# Patient Record
Sex: MALE | Race: OTHER | Employment: FULL TIME | URBAN - METROPOLITAN AREA
[De-identification: names, ages, dates, MRNs, and addresses within clinical notes are randomized per-mention and may not be internally consistent; named-entity substitution may affect disease eponyms.]

---

## 2019-01-23 ENCOUNTER — HOSPITAL ENCOUNTER (EMERGENCY)
Age: 49
Discharge: HOME | End: 2019-01-23
Payer: COMMERCIAL

## 2019-01-23 VITALS
DIASTOLIC BLOOD PRESSURE: 88 MMHG | WEIGHT: 256 LBS | HEART RATE: 82 BPM | RESPIRATION RATE: 16 BRPM | HEIGHT: 68 IN | TEMPERATURE: 97.4 F | OXYGEN SATURATION: 98 % | SYSTOLIC BLOOD PRESSURE: 154 MMHG | BODY MASS INDEX: 38.8 KG/M2

## 2019-01-23 DIAGNOSIS — K27.9 PEPTIC ULCER DISEASE: ICD-10-CM

## 2019-01-23 DIAGNOSIS — R10.13 ABDOMINAL PAIN, EPIGASTRIC: Primary | ICD-10-CM

## 2019-01-23 LAB
ALBUMIN SERPL-MCNC: 4.1 G/DL (ref 3.9–4.9)
ALP BLD-CCNC: 72 U/L (ref 35–104)
ALT SERPL-CCNC: 10 U/L (ref 0–41)
ANION GAP SERPL CALCULATED.3IONS-SCNC: 13 MEQ/L (ref 7–13)
ANISOCYTOSIS: ABNORMAL
AST SERPL-CCNC: 14 U/L (ref 0–40)
BASOPHILS ABSOLUTE: 0.1 K/UL (ref 0–0.2)
BASOPHILS RELATIVE PERCENT: 0.8 %
BILIRUB SERPL-MCNC: <0.2 MG/DL (ref 0–1.2)
BUN BLDV-MCNC: 14 MG/DL (ref 6–20)
CALCIUM SERPL-MCNC: 8.6 MG/DL (ref 8.6–10.2)
CHLORIDE BLD-SCNC: 103 MEQ/L (ref 98–107)
CO2: 22 MEQ/L (ref 22–29)
CREAT SERPL-MCNC: 1.01 MG/DL (ref 0.7–1.2)
EKG ATRIAL RATE: 81 BPM
EKG P AXIS: 18 DEGREES
EKG P-R INTERVAL: 140 MS
EKG Q-T INTERVAL: 370 MS
EKG QRS DURATION: 88 MS
EKG QTC CALCULATION (BAZETT): 429 MS
EKG R AXIS: -4 DEGREES
EKG T AXIS: 5 DEGREES
EKG VENTRICULAR RATE: 81 BPM
EOSINOPHILS ABSOLUTE: 0.1 K/UL (ref 0–0.7)
EOSINOPHILS RELATIVE PERCENT: 1.7 %
GFR AFRICAN AMERICAN: >60
GFR NON-AFRICAN AMERICAN: >60
GLOBULIN: 3 G/DL (ref 2.3–3.5)
GLUCOSE BLD-MCNC: 112 MG/DL (ref 74–109)
HCT VFR BLD CALC: 25.3 % (ref 42–52)
HCT VFR BLD CALC: 25.5 % (ref 42–52)
HEMOGLOBIN: 7.4 G/DL (ref 14–18)
HEMOGLOBIN: 7.5 G/DL (ref 14–18)
HYPOCHROMIA: ABNORMAL
INR BLD: 1
LIPASE: 23 U/L (ref 13–60)
LYMPHOCYTES ABSOLUTE: 1 K/UL (ref 1–4.8)
LYMPHOCYTES RELATIVE PERCENT: 14.3 %
MCH RBC QN AUTO: 18 PG (ref 27–31.3)
MCHC RBC AUTO-ENTMCNC: 29.4 % (ref 33–37)
MCV RBC AUTO: 61.4 FL (ref 80–100)
MICROCYTES: ABNORMAL
MONOCYTES ABSOLUTE: 0.5 K/UL (ref 0.2–0.8)
MONOCYTES RELATIVE PERCENT: 7.5 %
NEUTROPHILS ABSOLUTE: 5.2 K/UL (ref 1.4–6.5)
NEUTROPHILS RELATIVE PERCENT: 75.7 %
OVALOCYTES: ABNORMAL
PDW BLD-RTO: 20.3 % (ref 11.5–14.5)
PLATELET # BLD: 316 K/UL (ref 130–400)
POIKILOCYTES: ABNORMAL
POLYCHROMASIA: ABNORMAL
POTASSIUM SERPL-SCNC: 3.9 MEQ/L (ref 3.5–5.1)
PROTHROMBIN TIME: 10.4 SEC (ref 9–11.5)
RBC # BLD: 4.14 M/UL (ref 4.7–6.1)
SLIDE REVIEW: ABNORMAL
SODIUM BLD-SCNC: 138 MEQ/L (ref 132–144)
TOTAL CK: 167 U/L (ref 0–190)
TOTAL PROTEIN: 7.1 G/DL (ref 6.4–8.1)
TROPONIN: <0.01 NG/ML (ref 0–0.01)
WBC # BLD: 6.9 K/UL (ref 4.8–10.8)

## 2019-01-23 PROCEDURE — 99284 EMERGENCY DEPT VISIT MOD MDM: CPT

## 2019-01-23 PROCEDURE — 85014 HEMATOCRIT: CPT

## 2019-01-23 PROCEDURE — 82550 ASSAY OF CK (CPK): CPT

## 2019-01-23 PROCEDURE — 6370000000 HC RX 637 (ALT 250 FOR IP): Performed by: NURSE PRACTITIONER

## 2019-01-23 PROCEDURE — 80053 COMPREHEN METABOLIC PANEL: CPT

## 2019-01-23 PROCEDURE — 85025 COMPLETE CBC W/AUTO DIFF WBC: CPT

## 2019-01-23 PROCEDURE — 85610 PROTHROMBIN TIME: CPT

## 2019-01-23 PROCEDURE — 93005 ELECTROCARDIOGRAM TRACING: CPT

## 2019-01-23 PROCEDURE — 36415 COLL VENOUS BLD VENIPUNCTURE: CPT

## 2019-01-23 PROCEDURE — 85018 HEMOGLOBIN: CPT

## 2019-01-23 PROCEDURE — 84484 ASSAY OF TROPONIN QUANT: CPT

## 2019-01-23 PROCEDURE — 83690 ASSAY OF LIPASE: CPT

## 2019-01-23 RX ORDER — PANTOPRAZOLE SODIUM 40 MG/1
40 TABLET, DELAYED RELEASE ORAL DAILY
Qty: 30 TABLET | Refills: 0 | Status: SHIPPED | OUTPATIENT
Start: 2019-01-23

## 2019-01-23 RX ORDER — SUCRALFATE 1 G/1
1 TABLET ORAL 4 TIMES DAILY
Qty: 120 TABLET | Refills: 3 | Status: SHIPPED | OUTPATIENT
Start: 2019-01-23 | End: 2021-04-06

## 2019-01-23 RX ORDER — MAGNESIUM HYDROXIDE/ALUMINUM HYDROXICE/SIMETHICONE 120; 1200; 1200 MG/30ML; MG/30ML; MG/30ML
15 SUSPENSION ORAL ONCE
Status: COMPLETED | OUTPATIENT
Start: 2019-01-23 | End: 2019-01-23

## 2019-01-23 RX ORDER — PANTOPRAZOLE SODIUM 40 MG/1
40 TABLET, DELAYED RELEASE ORAL ONCE
Status: COMPLETED | OUTPATIENT
Start: 2019-01-23 | End: 2019-01-23

## 2019-01-23 RX ADMIN — PANTOPRAZOLE SODIUM 40 MG: 40 TABLET, DELAYED RELEASE ORAL at 13:34

## 2019-01-23 RX ADMIN — ALUMINUM HYDROXIDE, MAGNESIUM HYDROXIDE, AND SIMETHICONE 15 ML: 200; 200; 20 SUSPENSION ORAL at 13:34

## 2019-01-23 RX ADMIN — LIDOCAINE HYDROCHLORIDE 10 ML: 20 SOLUTION ORAL; TOPICAL at 13:34

## 2019-01-23 ASSESSMENT — PAIN SCALES - GENERAL
PAINLEVEL_OUTOF10: 1
PAINLEVEL_OUTOF10: 0

## 2019-01-23 ASSESSMENT — PAIN DESCRIPTION - LOCATION: LOCATION: CHEST

## 2019-01-23 ASSESSMENT — PAIN SCALES - WONG BAKER: WONGBAKER_NUMERICALRESPONSE: 0;2

## 2019-01-23 ASSESSMENT — PAIN DESCRIPTION - PROGRESSION: CLINICAL_PROGRESSION: NOT CHANGED

## 2019-01-23 ASSESSMENT — PAIN - FUNCTIONAL ASSESSMENT: PAIN_FUNCTIONAL_ASSESSMENT: ACTIVITIES ARE NOT PREVENTED

## 2019-01-23 ASSESSMENT — PAIN DESCRIPTION - ONSET: ONSET: PROGRESSIVE

## 2019-01-23 ASSESSMENT — PAIN DESCRIPTION - FREQUENCY: FREQUENCY: INTERMITTENT

## 2019-01-23 ASSESSMENT — PAIN DESCRIPTION - PAIN TYPE: TYPE: ACUTE PAIN

## 2019-01-23 ASSESSMENT — PAIN DESCRIPTION - ORIENTATION: ORIENTATION: MID

## 2019-01-23 ASSESSMENT — PAIN DESCRIPTION - DESCRIPTORS: DESCRIPTORS: BURNING;SHARP

## 2019-01-24 PROCEDURE — 93010 ELECTROCARDIOGRAM REPORT: CPT | Performed by: INTERNAL MEDICINE

## 2021-04-04 ENCOUNTER — APPOINTMENT (OUTPATIENT)
Dept: CT IMAGING | Age: 51
End: 2021-04-04
Payer: COMMERCIAL

## 2021-04-04 ENCOUNTER — HOSPITAL ENCOUNTER (EMERGENCY)
Age: 51
Discharge: HOME | End: 2021-04-04
Payer: COMMERCIAL

## 2021-04-04 VITALS
TEMPERATURE: 98.7 F | SYSTOLIC BLOOD PRESSURE: 164 MMHG | DIASTOLIC BLOOD PRESSURE: 90 MMHG | HEART RATE: 90 BPM | BODY MASS INDEX: 40.92 KG/M2 | WEIGHT: 270 LBS | RESPIRATION RATE: 14 BRPM | OXYGEN SATURATION: 98 % | HEIGHT: 68 IN

## 2021-04-04 DIAGNOSIS — N20.0 KIDNEY STONE: Primary | ICD-10-CM

## 2021-04-04 LAB
ALBUMIN SERPL-MCNC: 4.5 G/DL (ref 3.5–4.6)
ALP BLD-CCNC: 89 U/L (ref 35–104)
ALT SERPL-CCNC: 21 U/L (ref 0–41)
ANION GAP SERPL CALCULATED.3IONS-SCNC: 9 MEQ/L (ref 9–15)
ANISOCYTOSIS: ABNORMAL
AST SERPL-CCNC: 24 U/L (ref 0–40)
BACTERIA: NEGATIVE /HPF
BASE EXCESS VENOUS: -1 (ref -3–3)
BASOPHILS ABSOLUTE: 0.1 K/UL (ref 0–0.2)
BASOPHILS RELATIVE PERCENT: 0.5 %
BILIRUB SERPL-MCNC: 0.5 MG/DL (ref 0.2–0.7)
BILIRUBIN URINE: NEGATIVE
BLOOD, URINE: ABNORMAL
BUN BLDV-MCNC: 23 MG/DL (ref 6–20)
CALCIUM IONIZED: 1.12 MMOL/L (ref 1.12–1.32)
CALCIUM SERPL-MCNC: 9.8 MG/DL (ref 8.5–9.9)
CHLORIDE BLD-SCNC: 97 MEQ/L (ref 95–107)
CLARITY: CLEAR
CO2: 26 MEQ/L (ref 20–31)
COLOR: ABNORMAL
CREAT SERPL-MCNC: 1.7 MG/DL (ref 0.7–1.2)
EOSINOPHILS ABSOLUTE: 0 K/UL (ref 0–0.7)
EOSINOPHILS RELATIVE PERCENT: 0.1 %
EPITHELIAL CELLS, UA: ABNORMAL /HPF (ref 0–5)
GFR AFRICAN AMERICAN: 49
GFR AFRICAN AMERICAN: 52.1
GFR NON-AFRICAN AMERICAN: 40
GFR NON-AFRICAN AMERICAN: 43.1
GLOBULIN: 3.8 G/DL (ref 2.3–3.5)
GLUCOSE BLD-MCNC: 135 MG/DL (ref 60–115)
GLUCOSE BLD-MCNC: 157 MG/DL (ref 70–99)
GLUCOSE URINE: NEGATIVE MG/DL
HCO3 VENOUS: 24.5 MMOL/L (ref 23–29)
HCT VFR BLD CALC: 40.8 % (ref 42–52)
HEMOGLOBIN: 12.6 G/DL (ref 14–18)
HEMOGLOBIN: 13.3 GM/DL (ref 13.5–17.5)
HYALINE CASTS: ABNORMAL /HPF (ref 0–5)
KETONES, URINE: NEGATIVE MG/DL
LACTATE: 1.92 MMOL/L (ref 0.4–2)
LEUKOCYTE ESTERASE, URINE: NEGATIVE
LIPASE: 18 U/L (ref 12–95)
LYMPHOCYTES ABSOLUTE: 1.2 K/UL (ref 1–4.8)
LYMPHOCYTES RELATIVE PERCENT: 7.9 %
MCH RBC QN AUTO: 22.3 PG (ref 27–31.3)
MCHC RBC AUTO-ENTMCNC: 30.9 % (ref 33–37)
MCV RBC AUTO: 72.1 FL (ref 80–100)
MICROCYTES: ABNORMAL
MONOCYTES ABSOLUTE: 1.2 K/UL (ref 0.2–0.8)
MONOCYTES RELATIVE PERCENT: 7.9 %
NEUTROPHILS ABSOLUTE: 13.1 K/UL (ref 1.4–6.5)
NEUTROPHILS RELATIVE PERCENT: 83.6 %
NITRITE, URINE: NEGATIVE
O2 SAT, VEN: 60 %
OVALOCYTES: ABNORMAL
PCO2, VEN: 41 MM HG (ref 40–50)
PDW BLD-RTO: 18 % (ref 11.5–14.5)
PERFORMED ON: ABNORMAL
PH UA: 6 (ref 5–9)
PH VENOUS: 7.38 (ref 7.35–7.45)
PLATELET # BLD: 275 K/UL (ref 130–400)
PLATELET SLIDE REVIEW: NORMAL
PO2, VEN: 32 MM HG
POC CHLORIDE: 101 MEQ/L (ref 99–110)
POC CREATININE: 1.8 MG/DL (ref 0.9–1.3)
POC HEMATOCRIT: 39 % (ref 41–53)
POC POTASSIUM: 4 MEQ/L (ref 3.5–5.1)
POC SAMPLE TYPE: ABNORMAL
POC SODIUM: 136 MEQ/L (ref 136–145)
POIKILOCYTES: ABNORMAL
POLYCHROMASIA: ABNORMAL
POTASSIUM SERPL-SCNC: 5.2 MEQ/L (ref 3.4–4.9)
PROTEIN UA: NEGATIVE MG/DL
RBC # BLD: 5.66 M/UL (ref 4.7–6.1)
RBC UA: ABNORMAL /HPF (ref 0–5)
SLIDE REVIEW: ABNORMAL
SODIUM BLD-SCNC: 132 MEQ/L (ref 135–144)
SPECIFIC GRAVITY UA: 1.02 (ref 1–1.03)
TCO2 CALC VENOUS: 26 MMOL/L
TEAR DROP CELLS: ABNORMAL
TOTAL PROTEIN: 8.3 G/DL (ref 6.3–8)
URINE REFLEX TO CULTURE: ABNORMAL
UROBILINOGEN, URINE: 0.2 E.U./DL
WBC # BLD: 15.7 K/UL (ref 4.8–10.8)
WBC UA: ABNORMAL /HPF (ref 0–5)

## 2021-04-04 PROCEDURE — 36600 WITHDRAWAL OF ARTERIAL BLOOD: CPT

## 2021-04-04 PROCEDURE — 6360000002 HC RX W HCPCS: Performed by: PERSONAL EMERGENCY RESPONSE ATTENDANT

## 2021-04-04 PROCEDURE — 84295 ASSAY OF SERUM SODIUM: CPT

## 2021-04-04 PROCEDURE — 82435 ASSAY OF BLOOD CHLORIDE: CPT

## 2021-04-04 PROCEDURE — 96375 TX/PRO/DX INJ NEW DRUG ADDON: CPT

## 2021-04-04 PROCEDURE — 96374 THER/PROPH/DIAG INJ IV PUSH: CPT

## 2021-04-04 PROCEDURE — 74150 CT ABDOMEN W/O CONTRAST: CPT

## 2021-04-04 PROCEDURE — 85025 COMPLETE CBC W/AUTO DIFF WBC: CPT

## 2021-04-04 PROCEDURE — 81001 URINALYSIS AUTO W/SCOPE: CPT

## 2021-04-04 PROCEDURE — 84132 ASSAY OF SERUM POTASSIUM: CPT

## 2021-04-04 PROCEDURE — 99282 EMERGENCY DEPT VISIT SF MDM: CPT

## 2021-04-04 PROCEDURE — 83690 ASSAY OF LIPASE: CPT

## 2021-04-04 PROCEDURE — 82803 BLOOD GASES ANY COMBINATION: CPT

## 2021-04-04 PROCEDURE — 85014 HEMATOCRIT: CPT

## 2021-04-04 PROCEDURE — 83605 ASSAY OF LACTIC ACID: CPT

## 2021-04-04 PROCEDURE — 99283 EMERGENCY DEPT VISIT LOW MDM: CPT

## 2021-04-04 PROCEDURE — 82330 ASSAY OF CALCIUM: CPT

## 2021-04-04 PROCEDURE — 36415 COLL VENOUS BLD VENIPUNCTURE: CPT

## 2021-04-04 PROCEDURE — 80053 COMPREHEN METABOLIC PANEL: CPT

## 2021-04-04 PROCEDURE — 2580000003 HC RX 258: Performed by: PERSONAL EMERGENCY RESPONSE ATTENDANT

## 2021-04-04 PROCEDURE — 82565 ASSAY OF CREATININE: CPT

## 2021-04-04 RX ORDER — TAMSULOSIN HYDROCHLORIDE 0.4 MG/1
0.4 CAPSULE ORAL DAILY
Qty: 5 CAPSULE | Refills: 0 | Status: SHIPPED | OUTPATIENT
Start: 2021-04-04 | End: 2021-04-30

## 2021-04-04 RX ORDER — ONDANSETRON 4 MG/1
4 TABLET, ORALLY DISINTEGRATING ORAL EVERY 8 HOURS PRN
Qty: 20 TABLET | Refills: 0 | Status: SHIPPED | OUTPATIENT
Start: 2021-04-04 | End: 2021-04-30

## 2021-04-04 RX ORDER — ONDANSETRON 2 MG/ML
4 INJECTION INTRAMUSCULAR; INTRAVENOUS ONCE
Status: COMPLETED | OUTPATIENT
Start: 2021-04-04 | End: 2021-04-04

## 2021-04-04 RX ORDER — 0.9 % SODIUM CHLORIDE 0.9 %
1000 INTRAVENOUS SOLUTION INTRAVENOUS ONCE
Status: COMPLETED | OUTPATIENT
Start: 2021-04-04 | End: 2021-04-04

## 2021-04-04 RX ORDER — HYDROCODONE BITARTRATE AND ACETAMINOPHEN 5; 325 MG/1; MG/1
1 TABLET ORAL EVERY 6 HOURS PRN
Qty: 10 TABLET | Refills: 0 | Status: SHIPPED | OUTPATIENT
Start: 2021-04-04 | End: 2021-04-07

## 2021-04-04 RX ORDER — KETOROLAC TROMETHAMINE 30 MG/ML
30 INJECTION, SOLUTION INTRAMUSCULAR; INTRAVENOUS ONCE
Status: COMPLETED | OUTPATIENT
Start: 2021-04-04 | End: 2021-04-04

## 2021-04-04 RX ADMIN — ONDANSETRON 4 MG: 2 INJECTION INTRAMUSCULAR; INTRAVENOUS at 03:49

## 2021-04-04 RX ADMIN — SODIUM CHLORIDE 1000 ML: 9 INJECTION, SOLUTION INTRAVENOUS at 03:49

## 2021-04-04 RX ADMIN — KETOROLAC TROMETHAMINE 30 MG: 30 INJECTION, SOLUTION INTRAMUSCULAR; INTRAVENOUS at 03:49

## 2021-04-04 ASSESSMENT — PAIN DESCRIPTION - DESCRIPTORS: DESCRIPTORS: SHARP;STABBING

## 2021-04-04 ASSESSMENT — PAIN DESCRIPTION - LOCATION: LOCATION: FLANK

## 2021-04-04 ASSESSMENT — ENCOUNTER SYMPTOMS
VOMITING: 1
BACK PAIN: 1
SORE THROAT: 0
BLOOD IN STOOL: 0
DIARRHEA: 0
COUGH: 0
COLOR CHANGE: 0
SHORTNESS OF BREATH: 0
ABDOMINAL PAIN: 1
RHINORRHEA: 0
NAUSEA: 1

## 2021-04-04 ASSESSMENT — PAIN SCALES - GENERAL
PAINLEVEL_OUTOF10: 8
PAINLEVEL_OUTOF10: 8

## 2021-04-04 ASSESSMENT — PAIN DESCRIPTION - FREQUENCY: FREQUENCY: CONTINUOUS

## 2021-04-04 NOTE — ED PROVIDER NOTES
(COLCRYS) 0.6 MG TABLET    Take 0.6 mg by mouth Take 0.6 mg by mouth    CYANOCOBALAMIN (VITAMIN B-12 CR PO)    Take by mouth    FOLIC ACID PO    Take by mouth    IBUPROFEN (ADVIL;MOTRIN) 200 MG TABLET    Take 200 mg by mouth as needed for Pain    INDOMETHACIN (INDOCIN) 50 MG CAPSULE    Take 50 mg by mouth Take 50 mg by mouth    LISINOPRIL (PRINIVIL;ZESTRIL) 20 MG TABLET    Take 1 tablet by mouth daily    MAGNESIUM PO    Take by mouth    OMEGA-3 FATTY ACIDS (FISH OIL PO)    Take by mouth    PANTOPRAZOLE (PROTONIX) 40 MG TABLET    Take 1 tablet by mouth daily    PROTEIN PO    Take by mouth    SUCRALFATE (CARAFATE) 1 GM TABLET    Take 1 tablet by mouth 4 times daily       ALLERGIES     Patient has no known allergies.     FAMILY HISTORY       Family History   Problem Relation Age of Onset    Diabetes Mother     Heart Disease Mother     Cancer Mother     Diabetes Father     Diabetes Brother           SOCIAL HISTORY       Social History     Socioeconomic History    Marital status:      Spouse name: None    Number of children: None    Years of education: None    Highest education level: None   Occupational History    None   Social Needs    Financial resource strain: None    Food insecurity     Worry: None     Inability: None    Transportation needs     Medical: None     Non-medical: None   Tobacco Use    Smoking status: Never Smoker    Smokeless tobacco: Never Used   Substance and Sexual Activity    Alcohol use: Yes     Comment: occ     Drug use: No    Sexual activity: Yes   Lifestyle    Physical activity     Days per week: None     Minutes per session: None    Stress: None   Relationships    Social connections     Talks on phone: None     Gets together: None     Attends Church service: None     Active member of club or organization: None     Attends meetings of clubs or organizations: None     Relationship status: None    Intimate partner violence     Fear of current or ex partner: None Emotionally abused: None     Physically abused: None     Forced sexual activity: None   Other Topics Concern    None   Social History Narrative    None         PHYSICAL EXAM         ED Triage Vitals [04/04/21 0258]   BP Temp Temp Source Pulse Resp SpO2 Height Weight   (!) 173/112 98.7 °F (37.1 °C) Temporal 98 16 99 % 5' 8\" (1.727 m) 270 lb (122.5 kg)       Physical Exam  Constitutional:       Appearance: He is well-developed. HENT:      Head: Normocephalic and atraumatic. Eyes:      Conjunctiva/sclera: Conjunctivae normal.      Pupils: Pupils are equal, round, and reactive to light. Neck:      Musculoskeletal: Normal range of motion and neck supple. Trachea: No tracheal deviation. Cardiovascular:      Heart sounds: Normal heart sounds. Pulmonary:      Effort: Pulmonary effort is normal. No respiratory distress. Breath sounds: Normal breath sounds. No stridor. Abdominal:      General: Bowel sounds are normal. There is no distension. Palpations: Abdomen is soft. There is no mass. Tenderness: There is abdominal tenderness. There is no guarding or rebound. Comments: Patient has mild left upper quadrant left lower quadrant abdominal tenderness to palpation. Abdomen soft and nondistended, no rebound or rigidity, no pulsatile mass or bruit, no ecchymosis. No flank ecchymosis. No CVA tenderness. Musculoskeletal: Normal range of motion. Skin:     General: Skin is warm and dry. Capillary Refill: Capillary refill takes less than 2 seconds. Findings: No rash. Neurological:      Mental Status: He is alert and oriented to person, place, and time. Deep Tendon Reflexes: Reflexes are normal and symmetric. Psychiatric:         Behavior: Behavior normal.         Thought Content:  Thought content normal.         Judgment: Judgment normal.         DIAGNOSTIC RESULTS     EKG:All EKG's are interpreted by the Emergency Department Physician who either signs or Co-signs this chart in the absence of a cardiologist.        RADIOLOGY:   Non-plain film images such as CT, Ultrasound and MRI are read by theradiologist. Plain radiographic images are visualized and preliminarily interpreted by the emergency physician with the below findings:    Interpretation per theRadiologist below, if available at the time of this note:    CT KIDNEY WO CONTRAST    (Results Pending)           LABS:  Labs Reviewed   COMPREHENSIVE METABOLIC PANEL - Abnormal; Notable for the following components:       Result Value    Sodium 132 (*)     Potassium 5.2 (*)     Glucose 157 (*)     BUN 23 (*)     CREATININE 1.70 (*)     GFR Non- 43.1 (*)     GFR  52.1 (*)     Total Protein 8.3 (*)     Globulin 3.8 (*)     All other components within normal limits   CBC WITH AUTO DIFFERENTIAL - Abnormal; Notable for the following components:    WBC 15.7 (*)     Hemoglobin 12.6 (*)     Hematocrit 40.8 (*)     MCV 72.1 (*)     MCH 22.3 (*)     MCHC 30.9 (*)     RDW 18.0 (*)     All other components within normal limits   POCT VENOUS - Abnormal; Notable for the following components:    POC Glucose 135 (*)     POC Creatinine 1.8 (*)     GFR Non- 40 (*)     GFR African American 49 (*)     Hemoglobin 13.3 (*)     POC Hematocrit 39 (*)     All other components within normal limits   LIPASE   URINE RT REFLEX TO CULTURE   POCT EPOC BLOOD GAS, LACTIC ACID, ICA       All other labs were within normal range or not returned as of this dictation.     EMERGENCY DEPARTMENT COURSE and DIFFERENTIAL DIAGNOSIS/MDM:   Vitals:    Vitals:    04/04/21 0258 04/04/21 0424 04/04/21 0430 04/04/21 0450   BP: (!) 173/112 (!) 156/90 (!) 164/90    Pulse: 98 99 90    Resp: 16 14     Temp: 98.7 °F (37.1 °C)      TempSrc: Temporal      SpO2: 99% 100% 98% 98%   Weight: 270 lb (122.5 kg)      Height: 5' 8\" (1.727 m)            MDM    Renal CT shows mild to moderate left hydronephrosis secondary to a 1 cm calculus at the left ureteropelvic junction. There is associated left perinephric stranding. Lab revealed sodium 132, potassium 5.2, BUN 23, creatinine 1.7, WBC 15. 7. patient was given 1 L IV fluids and Toradol and Zofran. After IV fluids lab work was repeated with potassium within normal limits at 4, creatinine 1.8. Patient appears nontoxic in no apparent distress. Urine shows no infection. Patient be placed on Flomax, Zofran, Norco with urology follow-up. He is aware if symptoms are to worsen to return to the emergency room. Standard anticipatory guidance given to patient upon discharge. Have given them a specific time frame in which to follow-up and who to follow-up with. I have also advised them that they should return to the emergency department if they get worse, or not getting better or develop any new or concerning symptoms. Patient demonstrates understanding. CRITICAL CARE TIME   Total Critical Caretime was 0 minutes, excluding separately reportable procedures. There was a high probability of clinically significant/life threatening deterioration in the patient's condition which required my urgent intervention. Procedures    FINAL IMPRESSION      1. Kidney stone          DISPOSITION/PLAN   DISPOSITION Discharge - Pending Orders Complete 04/04/2021 05:03:45 AM      PATIENT REFERRED TO:  Roxy Tomas MD  [address] [address] [phone]    Call         DISCHARGE MEDICATIONS:  New Prescriptions    HYDROCODONE-ACETAMINOPHEN (NORCO) 5-325 MG PER TABLET    Take 1 tablet by mouth every 6 hours as needed for Pain for up to 3 days. Intended supply: 3 days.  Take lowest dose possible to manage pain    ONDANSETRON (ZOFRAN ODT) 4 MG DISINTEGRATING TABLET    Take 1 tablet by mouth every 8 hours as needed for Nausea    TAMSULOSIN (FLOMAX) 0.4 MG CAPSULE    Take 1 capsule by mouth daily for 5 doses          (Please notethat portions of this note were completed with a voice recognition

## 2021-04-04 NOTE — ED NOTES
Pt states nausea is gone and pain is better. Caty COLEMAN updated. Vitals updated. Call bell in reach. Lights turned off.   Pt given warm blanket     Analilia Galo RN  04/04/21 9818

## 2021-04-06 ENCOUNTER — HOSPITAL ENCOUNTER (OUTPATIENT)
Dept: GENERAL RADIOLOGY | Age: 51
Discharge: HOME | End: 2021-04-08
Payer: COMMERCIAL

## 2021-04-06 ENCOUNTER — TELEPHONE (OUTPATIENT)
Dept: UROLOGY | Age: 51
End: 2021-04-06

## 2021-04-06 ENCOUNTER — OFFICE VISIT (OUTPATIENT)
Dept: UROLOGY | Age: 51
End: 2021-04-06
Payer: COMMERCIAL

## 2021-04-06 VITALS
BODY MASS INDEX: 40.92 KG/M2 | SYSTOLIC BLOOD PRESSURE: 140 MMHG | HEART RATE: 95 BPM | WEIGHT: 270 LBS | DIASTOLIC BLOOD PRESSURE: 100 MMHG | HEIGHT: 68 IN

## 2021-04-06 DIAGNOSIS — N20.2 CALCULUS OF KIDNEY WITH CALCULUS OF URETER: Primary | ICD-10-CM

## 2021-04-06 DIAGNOSIS — Z01.818 PREOP TESTING: Primary | ICD-10-CM

## 2021-04-06 DIAGNOSIS — R33.9 RETENTION OF URINE: ICD-10-CM

## 2021-04-06 DIAGNOSIS — N20.0 KIDNEY STONE: ICD-10-CM

## 2021-04-06 DIAGNOSIS — N20.0 KIDNEY STONE: Primary | ICD-10-CM

## 2021-04-06 LAB — POST VOID RESIDUAL (PVR): 29 ML

## 2021-04-06 PROCEDURE — 51798 US URINE CAPACITY MEASURE: CPT | Performed by: UROLOGY

## 2021-04-06 PROCEDURE — 99204 OFFICE O/P NEW MOD 45 MIN: CPT | Performed by: UROLOGY

## 2021-04-06 PROCEDURE — 74018 RADEX ABDOMEN 1 VIEW: CPT

## 2021-04-06 RX ORDER — TAMSULOSIN HYDROCHLORIDE 0.4 MG/1
0.4 CAPSULE ORAL DAILY
Qty: 15 CAPSULE | Refills: 0 | Status: SHIPPED | OUTPATIENT
Start: 2021-04-06 | End: 2021-04-30

## 2021-04-06 RX ORDER — CEPHALEXIN 500 MG/1
500 CAPSULE ORAL EVERY 12 HOURS
Qty: 20 CAPSULE | Refills: 0 | Status: SHIPPED | OUTPATIENT
Start: 2021-04-06 | End: 2021-04-30

## 2021-04-06 RX ORDER — OXYCODONE HYDROCHLORIDE AND ACETAMINOPHEN 5; 325 MG/1; MG/1
1 TABLET ORAL EVERY 6 HOURS PRN
Qty: 20 TABLET | Refills: 0 | Status: SHIPPED | OUTPATIENT
Start: 2021-04-06 | End: 2021-04-11

## 2021-04-06 NOTE — PROGRESS NOTES
[facility] EVALUATION NOTE                                                 H&P                                                                                                                                                 Reason for Visit  Left renal colic secondary to left ureteral calculus at the left UPJ    History of Present Illness  2-week history of severe renal colic  Currently asymptomatic  CT scan shows 1.1 cm stone in and around the UPJ  Current KUB shows a stone probably in the renal pelvis  Patient has not had renal colic in some time  Denies hematuria  No previous history of stones  Urine pH is 6  Unlikely to be uric acid stone although very faint        Urologic Review of Systems/Symptoms  No previous history of stones    Review of Systems  Hospitalization: No recent hospitalization  All 14 categories of Review of Systems otherwise reviewed no other findings reported. History reviewed. No pertinent past medical history.   Past Surgical History:   Procedure Laterality Date    APPENDECTOMY  1976     Social History     Socioeconomic History    Marital status:      Spouse name: None    Number of children: None    Years of education: None    Highest education level: None   Occupational History    None   Social Needs    Financial resource strain: None    Food insecurity     Worry: None     Inability: None    Transportation needs     Medical: None     Non-medical: None   Tobacco Use    Smoking status: Never Smoker    Smokeless tobacco: Never Used   Substance and Sexual Activity    Alcohol use: Yes     Comment: occ     Drug use: No    Sexual activity: Yes   Lifestyle    Physical activity     Days per week: None     Minutes per session: None    Stress: None   Relationships    Social connections     Talks on phone: None     Gets together: None     Attends Congregation service: None     Active member of club or organization: None     Attends meetings of clubs or organizations: None     Relationship status: None    Intimate partner violence     Fear of current or ex partner: None     Emotionally abused: None     Physically abused: None     Forced sexual activity: None   Other Topics Concern    None   Social History Narrative    None     Family History   Problem Relation Age of Onset    Diabetes Mother     Heart Disease Mother     Cancer Mother     Diabetes Father     Diabetes Brother      Current Outpatient Medications   Medication Sig Dispense Refill    Omega-3 Fatty Acids (FISH OIL PO) Take by mouth      tamsulosin (FLOMAX) 0.4 MG capsule Take 1 capsule by mouth daily for 5 doses 5 capsule 0    pantoprazole (PROTONIX) 40 MG tablet Take 1 tablet by mouth daily 30 tablet 0    Cholecalciferol (VITAMIN D PO) Take by mouth      Ascorbic Acid (VITAMIN C) 500 MG tablet Take 500 mg by mouth daily      ondansetron (ZOFRAN ODT) 4 MG disintegrating tablet Take 1 tablet by mouth every 8 hours as needed for Nausea (Patient not taking: Reported on 4/6/2021) 20 tablet 0    HYDROcodone-acetaminophen (NORCO) 5-325 MG per tablet Take 1 tablet by mouth every 6 hours as needed for Pain for up to 3 days. Intended supply: 3 days. Take lowest dose possible to manage pain (Patient not taking: Reported on 4/6/2021) 10 tablet 0    ibuprofen (ADVIL;MOTRIN) 200 MG tablet Take 200 mg by mouth as needed for Pain       No current facility-administered medications for this visit. Patient has no known allergies. All reviewed and verified by Dr Janina Trivedi on today's visit    No results found for: PSA, PSADIA  No results found for this visit on 04/06/21. Physical Exam  Vitals:    04/06/21 1017   BP: (!) 140/100   Pulse: 95   Weight: 270 lb (122.5 kg)   Height: 5' 8\" (1.727 m)     Constitutional: Not in distress currently. Head and Neck: Normal  Cardiovascular: Normal rate, BP reviewed. Regular rhythm  Pulmonary/Chest: Normal respiratory effort not short of breath  Abdominal: Not distended.   Normal Urologic Exam  CT reviewed. Musculoskeletal: Ambulatory. Extremities: No edema  Neurological: No deficits  Skin: No lesions  Psychiatric: Normal affect. Assessment/Medical Necessity-Decision Making  Left renal colic secondary to a left UPJ stone very faint on KUB  Plan is left shockwave lithotripsy with possible retrograde pyelogram stone cannot be visualized with possible double-J stent placement  Patient given additional medication for pain, antibiotics, and tamsulosin  Patient also advised to lie down with the left side down to allow the stone to fall back into the renal pelvis  Plan  Patient will be scheduled for 4/14/2021  All risks and benefits outlined in detail  Greater than 50% of 50 minutes spent consulting patient face-to-face  No orders of the defined types were placed in this encounter. No orders of the defined types were placed in this encounter. Marlin Vigil MD       Please note this report has been partially produced using speech recognition software  And may cause contain errors related to that system including grammar, punctuation and spelling as well as words and phrases that may seem inappropriate. If there are questions or concerns please feel free to contact me to clarify.

## 2021-04-08 ENCOUNTER — NURSE ONLY (OUTPATIENT)
Dept: PRIMARY CARE CLINIC | Age: 51
End: 2021-04-08

## 2021-04-09 LAB
SARS-COV-2: NOT DETECTED
SOURCE: NORMAL

## 2021-04-14 ENCOUNTER — HOSPITAL ENCOUNTER (OUTPATIENT)
Age: 51
Setting detail: OUTPATIENT SURGERY
Discharge: HOME | End: 2021-04-14
Attending: UROLOGY | Admitting: UROLOGY
Payer: COMMERCIAL

## 2021-04-14 ENCOUNTER — ANESTHESIA (OUTPATIENT)
Dept: OPERATING ROOM | Age: 51
End: 2021-04-14
Payer: COMMERCIAL

## 2021-04-14 ENCOUNTER — APPOINTMENT (OUTPATIENT)
Dept: GENERAL RADIOLOGY | Age: 51
End: 2021-04-14
Attending: UROLOGY
Payer: COMMERCIAL

## 2021-04-14 ENCOUNTER — ANESTHESIA EVENT (OUTPATIENT)
Dept: OPERATING ROOM | Age: 51
End: 2021-04-14
Payer: COMMERCIAL

## 2021-04-14 VITALS
HEART RATE: 96 BPM | TEMPERATURE: 97.4 F | WEIGHT: 265 LBS | OXYGEN SATURATION: 96 % | RESPIRATION RATE: 15 BRPM | DIASTOLIC BLOOD PRESSURE: 92 MMHG | HEIGHT: 68 IN | SYSTOLIC BLOOD PRESSURE: 172 MMHG | BODY MASS INDEX: 40.16 KG/M2

## 2021-04-14 VITALS — DIASTOLIC BLOOD PRESSURE: 52 MMHG | SYSTOLIC BLOOD PRESSURE: 95 MMHG | TEMPERATURE: 98.6 F | OXYGEN SATURATION: 100 %

## 2021-04-14 DIAGNOSIS — N20.0 CALCULUS OF KIDNEY: Primary | ICD-10-CM

## 2021-04-14 PROCEDURE — 74018 RADEX ABDOMEN 1 VIEW: CPT

## 2021-04-14 PROCEDURE — 3700000000 HC ANESTHESIA ATTENDED CARE: Performed by: UROLOGY

## 2021-04-14 PROCEDURE — 7100000000 HC PACU RECOVERY - FIRST 15 MIN: Performed by: UROLOGY

## 2021-04-14 PROCEDURE — 3600000004 HC SURGERY LEVEL 4 BASE: Performed by: UROLOGY

## 2021-04-14 PROCEDURE — 7100000001 HC PACU RECOVERY - ADDTL 15 MIN: Performed by: UROLOGY

## 2021-04-14 PROCEDURE — 2580000003 HC RX 258: Performed by: UROLOGY

## 2021-04-14 PROCEDURE — 6360000002 HC RX W HCPCS: Performed by: NURSE ANESTHETIST, CERTIFIED REGISTERED

## 2021-04-14 PROCEDURE — 2709999900 HC NON-CHARGEABLE SUPPLY: Performed by: UROLOGY

## 2021-04-14 PROCEDURE — 7100000011 HC PHASE II RECOVERY - ADDTL 15 MIN: Performed by: UROLOGY

## 2021-04-14 PROCEDURE — 7100000010 HC PHASE II RECOVERY - FIRST 15 MIN: Performed by: UROLOGY

## 2021-04-14 PROCEDURE — 3700000001 HC ADD 15 MINUTES (ANESTHESIA): Performed by: UROLOGY

## 2021-04-14 PROCEDURE — 3600000014 HC SURGERY LEVEL 4 ADDTL 15MIN: Performed by: UROLOGY

## 2021-04-14 PROCEDURE — 50590 FRAGMENTING OF KIDNEY STONE: CPT | Performed by: UROLOGY

## 2021-04-14 RX ORDER — ONDANSETRON 2 MG/ML
4 INJECTION INTRAMUSCULAR; INTRAVENOUS
Status: DISCONTINUED | OUTPATIENT
Start: 2021-04-14 | End: 2021-04-14 | Stop reason: HOSPADM

## 2021-04-14 RX ORDER — MEPERIDINE HYDROCHLORIDE 25 MG/ML
12.5 INJECTION INTRAMUSCULAR; INTRAVENOUS; SUBCUTANEOUS EVERY 5 MIN PRN
Status: DISCONTINUED | OUTPATIENT
Start: 2021-04-14 | End: 2021-04-14 | Stop reason: HOSPADM

## 2021-04-14 RX ORDER — DEXAMETHASONE SODIUM PHOSPHATE 10 MG/ML
INJECTION INTRAMUSCULAR; INTRAVENOUS PRN
Status: DISCONTINUED | OUTPATIENT
Start: 2021-04-14 | End: 2021-04-14 | Stop reason: SDUPTHER

## 2021-04-14 RX ORDER — HYDROCODONE BITARTRATE AND ACETAMINOPHEN 5; 325 MG/1; MG/1
2 TABLET ORAL PRN
Status: DISCONTINUED | OUTPATIENT
Start: 2021-04-14 | End: 2021-04-14 | Stop reason: HOSPADM

## 2021-04-14 RX ORDER — HYDROCODONE BITARTRATE AND ACETAMINOPHEN 5; 325 MG/1; MG/1
1 TABLET ORAL EVERY 4 HOURS PRN
Qty: 18 TABLET | Refills: 0 | Status: SHIPPED | OUTPATIENT
Start: 2021-04-14 | End: 2021-04-17

## 2021-04-14 RX ORDER — FENTANYL CITRATE 50 UG/ML
50 INJECTION, SOLUTION INTRAMUSCULAR; INTRAVENOUS EVERY 10 MIN PRN
Status: DISCONTINUED | OUTPATIENT
Start: 2021-04-14 | End: 2021-04-14 | Stop reason: HOSPADM

## 2021-04-14 RX ORDER — KETOROLAC TROMETHAMINE 30 MG/ML
INJECTION, SOLUTION INTRAMUSCULAR; INTRAVENOUS PRN
Status: DISCONTINUED | OUTPATIENT
Start: 2021-04-14 | End: 2021-04-14 | Stop reason: SDUPTHER

## 2021-04-14 RX ORDER — SODIUM CHLORIDE, SODIUM LACTATE, POTASSIUM CHLORIDE, CALCIUM CHLORIDE 600; 310; 30; 20 MG/100ML; MG/100ML; MG/100ML; MG/100ML
INJECTION, SOLUTION INTRAVENOUS CONTINUOUS
Status: DISCONTINUED | OUTPATIENT
Start: 2021-04-14 | End: 2021-04-14 | Stop reason: HOSPADM

## 2021-04-14 RX ORDER — MIDAZOLAM HYDROCHLORIDE 2 MG/2ML
INJECTION, SOLUTION INTRAMUSCULAR; INTRAVENOUS PRN
Status: DISCONTINUED | OUTPATIENT
Start: 2021-04-14 | End: 2021-04-14 | Stop reason: SDUPTHER

## 2021-04-14 RX ORDER — LIDOCAINE HYDROCHLORIDE 20 MG/ML
INJECTION, SOLUTION INTRAVENOUS PRN
Status: DISCONTINUED | OUTPATIENT
Start: 2021-04-14 | End: 2021-04-14 | Stop reason: SDUPTHER

## 2021-04-14 RX ORDER — DIPHENHYDRAMINE HYDROCHLORIDE 50 MG/ML
12.5 INJECTION INTRAMUSCULAR; INTRAVENOUS
Status: DISCONTINUED | OUTPATIENT
Start: 2021-04-14 | End: 2021-04-14 | Stop reason: HOSPADM

## 2021-04-14 RX ORDER — FENTANYL CITRATE 50 UG/ML
INJECTION, SOLUTION INTRAMUSCULAR; INTRAVENOUS PRN
Status: DISCONTINUED | OUTPATIENT
Start: 2021-04-14 | End: 2021-04-14 | Stop reason: SDUPTHER

## 2021-04-14 RX ORDER — TAMSULOSIN HYDROCHLORIDE 0.4 MG/1
0.4 CAPSULE ORAL DAILY
Qty: 10 CAPSULE | Refills: 0 | Status: SHIPPED | OUTPATIENT
Start: 2021-04-14 | End: 2021-04-30

## 2021-04-14 RX ORDER — METOCLOPRAMIDE HYDROCHLORIDE 5 MG/ML
10 INJECTION INTRAMUSCULAR; INTRAVENOUS
Status: DISCONTINUED | OUTPATIENT
Start: 2021-04-14 | End: 2021-04-14 | Stop reason: HOSPADM

## 2021-04-14 RX ORDER — PROPOFOL 10 MG/ML
INJECTION, EMULSION INTRAVENOUS PRN
Status: DISCONTINUED | OUTPATIENT
Start: 2021-04-14 | End: 2021-04-14 | Stop reason: SDUPTHER

## 2021-04-14 RX ORDER — HYDROCODONE BITARTRATE AND ACETAMINOPHEN 5; 325 MG/1; MG/1
1 TABLET ORAL PRN
Status: DISCONTINUED | OUTPATIENT
Start: 2021-04-14 | End: 2021-04-14 | Stop reason: HOSPADM

## 2021-04-14 RX ORDER — ONDANSETRON 2 MG/ML
INJECTION INTRAMUSCULAR; INTRAVENOUS PRN
Status: DISCONTINUED | OUTPATIENT
Start: 2021-04-14 | End: 2021-04-14 | Stop reason: SDUPTHER

## 2021-04-14 RX ADMIN — DEXAMETHASONE SODIUM PHOSPHATE 10 MG: 10 INJECTION INTRAMUSCULAR; INTRAVENOUS at 13:14

## 2021-04-14 RX ADMIN — KETOROLAC TROMETHAMINE 30 MG: 30 INJECTION, SOLUTION INTRAMUSCULAR; INTRAVENOUS at 13:14

## 2021-04-14 RX ADMIN — LIDOCAINE HYDROCHLORIDE 50 MG: 20 INJECTION, SOLUTION INTRAVENOUS at 13:06

## 2021-04-14 RX ADMIN — MIDAZOLAM HYDROCHLORIDE 2 MG: 1 INJECTION, SOLUTION INTRAMUSCULAR; INTRAVENOUS at 13:04

## 2021-04-14 RX ADMIN — FENTANYL CITRATE 50 MCG: 50 INJECTION, SOLUTION INTRAMUSCULAR; INTRAVENOUS at 13:04

## 2021-04-14 RX ADMIN — SODIUM CHLORIDE, POTASSIUM CHLORIDE, SODIUM LACTATE AND CALCIUM CHLORIDE: 600; 310; 30; 20 INJECTION, SOLUTION INTRAVENOUS at 11:40

## 2021-04-14 RX ADMIN — FENTANYL CITRATE 50 MCG: 50 INJECTION, SOLUTION INTRAMUSCULAR; INTRAVENOUS at 13:14

## 2021-04-14 RX ADMIN — PROPOFOL 200 MG: 10 INJECTION, EMULSION INTRAVENOUS at 13:06

## 2021-04-14 RX ADMIN — ONDANSETRON 4 MG: 2 INJECTION INTRAMUSCULAR; INTRAVENOUS at 13:14

## 2021-04-14 ASSESSMENT — PULMONARY FUNCTION TESTS
PIF_VALUE: 11
PIF_VALUE: 4
PIF_VALUE: 11
PIF_VALUE: 2
PIF_VALUE: 4
PIF_VALUE: 1
PIF_VALUE: 10
PIF_VALUE: 11
PIF_VALUE: 15
PIF_VALUE: 11
PIF_VALUE: 5
PIF_VALUE: 11
PIF_VALUE: 11
PIF_VALUE: 3
PIF_VALUE: 11
PIF_VALUE: 2
PIF_VALUE: 0
PIF_VALUE: 11
PIF_VALUE: 5
PIF_VALUE: 2
PIF_VALUE: 11
PIF_VALUE: 2
PIF_VALUE: 11

## 2021-04-14 NOTE — ANESTHESIA PRE PROCEDURE
Department of Anesthesiology  Preprocedure Note       Name:  Alexey Wood   Age:  52 y.o.  :  1970                                          MRN:  [id number]         Date:  2021      Surgeon: Jaci Ram):  Holden Stein MD    Procedure: Procedure(s):  LEFT ESWL EXTRACORPOREAL SHOCK WAVE LITHOTRIPSY, RECENT LABS, DR TO DO H&P. KUB ON ARRIVAL. ([address] NOTIFIED CONF. #[id number])    Medications prior to admission:   Prior to Admission medications    Medication Sig Start Date End Date Taking?  Authorizing Provider   Omega-3 Fatty Acids (FISH OIL PO) Take by mouth    Historical Provider, MD   tamsulosin (FLOMAX) 0.4 MG capsule Take 1 capsule by mouth daily 21   Holden Stein MD   cephALEXin (KEFLEX) 500 MG capsule Take 1 capsule by mouth every 12 hours 21   Holden Stein MD   tamsulosin [facility]) 0.4 MG capsule Take 1 capsule by mouth daily for 5 doses 21  JARED Byers   ondansetron (ZOFRAN ODT) 4 MG disintegrating tablet Take 1 tablet by mouth every 8 hours as needed for Nausea  Patient not taking: Reported on 2021   JARED Byers   pantoprazole (PROTONIX) 40 MG tablet Take 1 tablet by mouth daily 19   LANCE Dotson - CNP   ibuprofen (ADVIL;MOTRIN) 200 MG tablet Take 200 mg by mouth as needed for Pain    Historical Provider, MD   Cholecalciferol (VITAMIN D PO) Take by mouth    Historical Provider, MD   Ascorbic Acid (VITAMIN C) 500 MG tablet Take 500 mg by mouth daily    Historical Provider, MD       Current medications:    Current Facility-Administered Medications   Medication Dose Route Frequency Provider Last Rate Last Admin    lactated ringers infusion   Intravenous Continuous Holden Stein MD        meperidine (DEMEROL) injection 12.5 mg  12.5 mg Intravenous Q5 Min PRN Shirley Gonzalez MD        fentaNYL (SUBLIMAZE) injection 50 mcg  50 mcg Intravenous Q10 Min PRN Shirley Gonzalez MD        HYDROmorphone (DILAUDID) injection 0.5 mg  0.5 mg Intravenous Q10 Min PRN Shirley Gonzalez MD        HYDROcodone-acetaminophen [facility]) 5-325 MG per tablet 1 tablet  1 tablet Oral PRN Shirley Gonzalez MD        Or    HYDROcodone-acetaminophen [facility]) 5-325 MG per tablet 2 tablet  2 tablet Oral PRN Shirley Gonzalez MD        ondansetron [facility]) injection 4 mg  4 mg Intravenous Once PRN Shirley Gonazlez MD        metoclopramide [facility]) injection 10 mg  10 mg Intravenous Once PRN Shirley Gonzalez MD        diphenhydrAMINE (BENADRYL) injection 12.5 mg  12.5 mg Intravenous Once PRN Shirley Gonzalez MD           Allergies:  No Known Allergies    Problem List:  There is no problem list on file for this patient. Past Medical History:  No past medical history on file. Past Surgical History:        Procedure Laterality Date    APPENDECTOMY  1976       Social History:    Social History     Tobacco Use    Smoking status: Never Smoker    Smokeless tobacco: Never Used   Substance Use Topics    Alcohol use: Yes     Comment: occ                                 Counseling given: Not Answered      Vital Signs (Current): There were no vitals filed for this visit.                                            BP Readings from Last 3 Encounters:   04/06/21 (!) 140/100   04/04/21 (!) 164/90   01/23/19 (!) 154/88       NPO Status:                                                                                 BMI:   Wt Readings from Last 3 Encounters:   04/06/21 270 lb (122.5 kg)   04/04/21 270 lb (122.5 kg)   01/23/19 256 lb (116.1 kg)     There is no height or weight on file to calculate BMI.    CBC:   Lab Results   Component Value Date    WBC 15.7 04/04/2021    RBC 5.66 04/04/2021    HGB 13.3 04/04/2021    HCT 40.8 04/04/2021    MCV 72.1 04/04/2021    RDW 18.0 04/04/2021     04/04/2021       CMP:   Lab Results   Component Value Date     04/04/2021    K 5.2 04/04/2021    CL 97 04/04/2021    CO2 26 04/04/2021 BUN 23 04/04/2021    CREATININE 1.8 04/04/2021    CREATININE 1.70 04/04/2021    GFRAA 49 04/04/2021    LABGLOM 40 04/04/2021    GLUCOSE 157 04/04/2021    GLUCOSE 137 02/02/2020    PROT 8.3 04/04/2021    CALCIUM 9.8 04/04/2021    BILITOT 0.5 04/04/2021    ALKPHOS 89 04/04/2021    AST 24 04/04/2021    ALT 21 04/04/2021       POC Tests: No results for input(s): POCGLU, POCNA, POCK, POCCL, POCBUN, POCHEMO, POCHCT in the last 72 hours. Coags:   Lab Results   Component Value Date    PROTIME 10.4 01/23/2019    INR 1.0 01/23/2019       HCG (If Applicable): No results found for: PREGTESTUR, PREGSERUM, HCG, HCGQUANT     ABGs: No results found for: PHART, PO2ART, BUL3SLW, OYE7OLT, BEART, P4KCCHQW     Type & Screen (If Applicable):  No results found for: LABABO, LABRH    Drug/Infectious Status (If Applicable):  No results found for: HIV, HEPCAB    COVID-19 Screening (If Applicable):   Lab Results   Component Value Date    COVID19 Not Detected 04/08/2021           Anesthesia Evaluation  Patient summary reviewed and Nursing notes reviewed no history of anesthetic complications:   Airway: Mallampati: II  TM distance: >3 FB   Neck ROM: full  Mouth opening: > = 3 FB Dental: normal exam         Pulmonary:Negative Pulmonary ROS and normal exam                               Cardiovascular:Negative CV ROS                      Neuro/Psych:               GI/Hepatic/Renal: Neg GI/Hepatic/Renal ROS            Endo/Other: Negative Endo/Other ROS                    Abdominal:   (+) obese,         Vascular: negative vascular ROS. Anesthesia Plan      general     ASA 2           MIPS: Prophylactic antiemetics administered. Plan discussed with CRNA.     Attending anesthesiologist reviewed and agrees with Pre Eval content              Cristi Pak MD   4/14/2021

## 2021-04-14 NOTE — OP NOTE
UROLOGY OPERATIVE NOTE    Pre-operative Diagnosis: left renal calculus  Post-operative Diagnosis:  Same  Procedure:  Left ESWL  Surgeon:  Savana Wong  Anesthesia:  Gen  2500 shocks with excellent fragmentation  See dictated operative report for full details.   Electronically signed by Chitra Banuelos MD on 4/14/21 at 1:59 PM EDT

## 2021-04-14 NOTE — BRIEF OP NOTE
UROLOGY OPERATIVE NOTE    Pre-operative Diagnosis: Left renal calculus  Post-operative Diagnosis:  Same  Procedure:  Left ESWL  Surgeon:  Jessica Best  Anesthesia:  General LMA  2500 shocks with excellent fragmentation  See dictated operative report for full details.   Electronically signed by Britni Bullard MD on 4/14/21 at 2:00 PM EDT

## 2021-04-15 NOTE — OP NOTE
[address]                      [facility], [address] [facility]                                OPERATIVE REPORT    PATIENT NAME: Yudy Barajas                        :        1970  MED REC NO:   [id number]                            ROOM:  ACCOUNT NO:   [de-identified]                           ADMIT DATE: 2021  PROVIDER:     Liane Finn MD    DATE OF PROCEDURE:  2021    PREOPERATIVE DIAGNOSIS:  Left UPJ stone with intermittent renal colic. POSTOPERATIVE DIAGNOSIS:  Left UPJ stone with intermittent renal colic. OPERATION PERFORMED:  Left shockwave lithotripsy. SURGEON:  Sanjuanita Donaldson. Moises Rosas MD    ANESTHESIA:  General.    Estimated blood loss not applicable    INDICATIONS:  The patient is a 53-year-old male, who has history of a  1.1-cm stone at the uteropelvic junction on the left causing  intermittent renal colic. The patient will be treated with shockwave  lithotripsy without a stent. The patient understands risks and benefits  including renal colic secondary to fragments and hematuria, possible  damage to the kidney with kidney fracture, loss of kidney. The patient  understands risks and benefits, wants to proceed. OPERATIVE PROCEDURE:  The patient had a KUB prior to the procedure and  was noted to have the stone in the same position. The patient currently  is asymptomatic. He was taken to the operating room, placed on the  lithotripsy treatment table in supine position. After initiation of  general anesthetic, the time-out was taken and the fluoroscope was used  to isolate the stone. Then, 2500 shocks at various kilovoltage were  applied with excellent fragmentation of stone. .  A 3-minute break was  taken after the initial 300 shocks. The patient tolerated the procedure  well. There were no complications.         Nathaniel Muniz MD    D: 2021 15:25:01       T: 2021 15:31:53     [id number]  Job#: [id number]     Doc#: [id number]

## 2021-04-19 ENCOUNTER — TELEPHONE (OUTPATIENT)
Dept: UROLOGY | Age: 51
End: 2021-04-19

## 2021-04-19 DIAGNOSIS — N20.0 KIDNEY STONE: Primary | ICD-10-CM

## 2021-04-28 ENCOUNTER — HOSPITAL ENCOUNTER (OUTPATIENT)
Dept: GENERAL RADIOLOGY | Age: 51
Discharge: HOME | End: 2021-04-30
Payer: COMMERCIAL

## 2021-04-28 DIAGNOSIS — N20.0 KIDNEY STONE: ICD-10-CM

## 2021-04-28 PROCEDURE — 74018 RADEX ABDOMEN 1 VIEW: CPT

## 2021-04-30 ENCOUNTER — VIRTUAL VISIT (OUTPATIENT)
Dept: UROLOGY | Age: 51
End: 2021-04-30

## 2021-04-30 DIAGNOSIS — N20.0 CALCULUS OF KIDNEY: Primary | ICD-10-CM

## 2021-04-30 PROCEDURE — 99024 POSTOP FOLLOW-UP VISIT: CPT | Performed by: UROLOGY

## 2021-04-30 NOTE — PROGRESS NOTES
Urology  Virtual visit over telephone due to COVID-19 pandemic  61-year-old postop visit for shockwave lithotripsy left 1 month ago  Repeat KUB done yesterday shows no evidence of calculus  Patient currently having no evidence of renal colic  Patient never saw any fragments of the stone but currently is asymptomatic  Dietary restrictions described to the patient in detail dietary directions were also sent over the mail  Follow-up as needed  Greater than 5 less than 10-minute postop visit over telephone  Arielle Gamble MD

## 2025-03-31 ENCOUNTER — HOSPITAL ENCOUNTER (INPATIENT)
Age: 55
LOS: 3 days | Discharge: HOME | DRG: 286 | End: 2025-04-03
Attending: EMERGENCY MEDICINE | Admitting: INTERNAL MEDICINE
Payer: COMMERCIAL

## 2025-03-31 ENCOUNTER — APPOINTMENT (OUTPATIENT)
Dept: GENERAL RADIOLOGY | Age: 55
DRG: 286 | End: 2025-03-31
Payer: COMMERCIAL

## 2025-03-31 DIAGNOSIS — R09.02 HYPOXIA: ICD-10-CM

## 2025-03-31 DIAGNOSIS — I10 MALIGNANT HYPERTENSION: ICD-10-CM

## 2025-03-31 DIAGNOSIS — N28.9 RENAL INSUFFICIENCY: ICD-10-CM

## 2025-03-31 DIAGNOSIS — R06.02 SHORTNESS OF BREATH: ICD-10-CM

## 2025-03-31 DIAGNOSIS — I42.9 CARDIOMYOPATHY: ICD-10-CM

## 2025-03-31 DIAGNOSIS — I50.20 SYSTOLIC CONGESTIVE HEART FAILURE, UNSPECIFIED HF CHRONICITY (HCC): Primary | ICD-10-CM

## 2025-03-31 PROBLEM — I50.1 PULMONARY EDEMA CARDIAC CAUSE (HCC): Status: ACTIVE | Noted: 2025-03-31

## 2025-03-31 LAB
ALBUMIN SERPL-MCNC: 4.3 G/DL (ref 3.5–4.6)
ALP SERPL-CCNC: 106 U/L (ref 35–104)
ALT SERPL-CCNC: 16 U/L (ref 0–41)
ANION GAP SERPL CALCULATED.3IONS-SCNC: 16 MEQ/L (ref 9–15)
ANISOCYTOSIS BLD QL SMEAR: ABNORMAL
AST SERPL-CCNC: 23 U/L (ref 0–40)
B PARAP IS1001 DNA NPH QL NAA+NON-PROBE: NOT DETECTED
B PERT.PT PRMT NPH QL NAA+NON-PROBE: NOT DETECTED
BACTERIA URNS QL MICRO: NEGATIVE /HPF
BASE EXCESS ARTERIAL: -9 (ref -3–3)
BASOPHILS # BLD: 0 K/UL (ref 0–0.2)
BASOPHILS NFR BLD: 0.3 %
BILIRUB SERPL-MCNC: 1.2 MG/DL (ref 0.2–0.7)
BILIRUB UR QL STRIP: NEGATIVE
BNP BLD-MCNC: 4040 PG/ML
BUN SERPL-MCNC: 19 MG/DL (ref 6–20)
C PNEUM DNA NPH QL NAA+NON-PROBE: NOT DETECTED
CALCIUM IONIZED: 1.13 MMOL/L (ref 1.12–1.32)
CALCIUM SERPL-MCNC: 8.8 MG/DL (ref 8.5–9.9)
CHLORIDE SERPL-SCNC: 91 MEQ/L (ref 95–107)
CLARITY UR: CLEAR
CO2 SERPL-SCNC: 19 MEQ/L (ref 20–31)
COLOR UR: YELLOW
CREAT SERPL-MCNC: 2.12 MG/DL (ref 0.7–1.2)
EKG ATRIAL RATE: 125 BPM
EKG P AXIS: 38 DEGREES
EKG P-R INTERVAL: 134 MS
EKG Q-T INTERVAL: 332 MS
EKG QRS DURATION: 98 MS
EKG QTC CALCULATION (BAZETT): 479 MS
EKG R AXIS: -35 DEGREES
EKG T AXIS: 127 DEGREES
EKG VENTRICULAR RATE: 125 BPM
EOSINOPHIL # BLD: 0 K/UL (ref 0–0.7)
EOSINOPHIL NFR BLD: 0.3 %
EPI CELLS #/AREA URNS AUTO: NORMAL /HPF (ref 0–5)
ERYTHROCYTE [DISTWIDTH] IN BLOOD BY AUTOMATED COUNT: 15.8 % (ref 11.5–14.5)
FLUAV RNA NPH QL NAA+NON-PROBE: NOT DETECTED
FLUBV RNA NPH QL NAA+NON-PROBE: NOT DETECTED
GLOBULIN SER CALC-MCNC: 3.3 G/DL (ref 2.3–3.5)
GLUCOSE BLD-MCNC: 270 MG/DL (ref 70–99)
GLUCOSE SERPL-MCNC: 273 MG/DL (ref 70–99)
GLUCOSE UR STRIP-MCNC: NEGATIVE MG/DL
HADV DNA NPH QL NAA+NON-PROBE: NOT DETECTED
HCO3 ARTERIAL: 16.9 MMOL/L (ref 21–29)
HCOV 229E RNA NPH QL NAA+NON-PROBE: NOT DETECTED
HCOV HKU1 RNA NPH QL NAA+NON-PROBE: NOT DETECTED
HCOV NL63 RNA NPH QL NAA+NON-PROBE: NOT DETECTED
HCOV OC43 RNA NPH QL NAA+NON-PROBE: NOT DETECTED
HCT VFR BLD AUTO: 42.4 % (ref 42–52)
HCT VFR BLD AUTO: 44 % (ref 41–53)
HGB BLD CALC-MCNC: 14.9 GM/DL (ref 13.5–17.5)
HGB BLD-MCNC: 12.4 G/DL (ref 14–18)
HGB UR QL STRIP: NEGATIVE
HMPV RNA NPH QL NAA+NON-PROBE: NOT DETECTED
HPIV1 RNA NPH QL NAA+NON-PROBE: NOT DETECTED
HPIV2 RNA NPH QL NAA+NON-PROBE: NOT DETECTED
HPIV3 RNA NPH QL NAA+NON-PROBE: NOT DETECTED
HPIV4 RNA NPH QL NAA+NON-PROBE: NOT DETECTED
HYALINE CASTS #/AREA URNS AUTO: NORMAL /HPF (ref 0–5)
HYPOCHROMIA BLD QL SMEAR: ABNORMAL
KETONES UR STRIP-MCNC: NEGATIVE MG/DL
LACTATE: 2.62 MMOL/L (ref 0.4–2)
LACTIC ACID, SEPSIS: 2.7 MMOL/L (ref 0.5–1.9)
LACTIC ACID, SEPSIS: 3 MMOL/L (ref 0.5–1.9)
LEUKOCYTE ESTERASE UR QL STRIP: NEGATIVE
LYMPHOCYTES # BLD: 0.8 K/UL (ref 1–4.8)
LYMPHOCYTES NFR BLD: 7.2 %
M PNEUMO DNA NPH QL NAA+NON-PROBE: NOT DETECTED
MAGNESIUM SERPL-MCNC: 1.8 MG/DL (ref 1.7–2.4)
MCH RBC QN AUTO: 22.9 PG (ref 27–31.3)
MCHC RBC AUTO-ENTMCNC: 29.2 % (ref 33–37)
MCV RBC AUTO: 78.2 FL (ref 79–92.2)
MICROCYTES BLD QL SMEAR: ABNORMAL
MONOCYTES # BLD: 0.5 K/UL (ref 0.2–0.8)
MONOCYTES NFR BLD: 4.5 %
NEUTROPHILS # BLD: 10.1 K/UL (ref 1.4–6.5)
NEUTS SEG NFR BLD: 87.2 %
NITRITE UR QL STRIP: NEGATIVE
O2 SAT, ARTERIAL: 100 % (ref 93–100)
OVALOCYTES BLD QL SMEAR: ABNORMAL
PCO2 ARTERIAL: 30 MM HG (ref 35–45)
PERFORMED ON: ABNORMAL
PH ARTERIAL: 7.36 (ref 7.35–7.45)
PH UR STRIP: 5.5 [PH] (ref 5–9)
PLATELET # BLD AUTO: 300 K/UL (ref 130–400)
PLATELET BLD QL SMEAR: ADEQUATE
PO2 ARTERIAL: 322 MM HG (ref 75–108)
POC CHLORIDE: 97 MEQ/L (ref 99–110)
POC CREATININE: 1.8 MG/DL (ref 0.8–1.3)
POC FIO2: 100
POC SAMPLE TYPE: ABNORMAL
POIKILOCYTOSIS BLD QL SMEAR: ABNORMAL
POLYCHROMASIA BLD QL SMEAR: ABNORMAL
POTASSIUM SERPL-SCNC: 4.1 MEQ/L (ref 3.4–4.9)
POTASSIUM SERPL-SCNC: 4.1 MEQ/L (ref 3.5–5.1)
PROCALCITONIN SERPL IA-MCNC: 0.06 NG/ML (ref 0–0.15)
PROT SERPL-MCNC: 7.6 G/DL (ref 6.3–8)
PROT UR STRIP-MCNC: 100 MG/DL
RBC # BLD AUTO: 5.42 M/UL (ref 4.7–6.1)
RBC #/AREA URNS AUTO: NORMAL /HPF (ref 0–5)
RSV RNA NPH QL NAA+NON-PROBE: NOT DETECTED
RV+EV RNA NPH QL NAA+NON-PROBE: NOT DETECTED
SARS-COV-2 RNA NPH QL NAA+NON-PROBE: NOT DETECTED
SLIDE REVIEW: ABNORMAL
SODIUM BLD-SCNC: 126 MEQ/L (ref 136–145)
SODIUM SERPL-SCNC: 126 MEQ/L (ref 135–144)
SP GR UR STRIP: 1.01 (ref 1–1.03)
TCO2 ARTERIAL: 18 MMOL/L (ref 21–32)
TROPONIN, HIGH SENSITIVITY: 41 NG/L (ref 0–19)
TROPONIN, HIGH SENSITIVITY: 47 NG/L (ref 0–19)
URINE REFLEX TO CULTURE: ABNORMAL
UROBILINOGEN UR STRIP-ACNC: 0.2 E.U./DL
WBC # BLD AUTO: 11.6 K/UL (ref 4.8–10.8)
WBC #/AREA URNS AUTO: NORMAL /HPF (ref 0–5)

## 2025-03-31 PROCEDURE — 84484 ASSAY OF TROPONIN QUANT: CPT

## 2025-03-31 PROCEDURE — 93005 ELECTROCARDIOGRAM TRACING: CPT | Performed by: EMERGENCY MEDICINE

## 2025-03-31 PROCEDURE — 81001 URINALYSIS AUTO W/SCOPE: CPT

## 2025-03-31 PROCEDURE — 84295 ASSAY OF SERUM SODIUM: CPT

## 2025-03-31 PROCEDURE — 84145 PROCALCITONIN (PCT): CPT

## 2025-03-31 PROCEDURE — 36415 COLL VENOUS BLD VENIPUNCTURE: CPT

## 2025-03-31 PROCEDURE — 6360000002 HC RX W HCPCS: Performed by: EMERGENCY MEDICINE

## 2025-03-31 PROCEDURE — 2060000000 HC ICU INTERMEDIATE R&B

## 2025-03-31 PROCEDURE — 84132 ASSAY OF SERUM POTASSIUM: CPT

## 2025-03-31 PROCEDURE — 83735 ASSAY OF MAGNESIUM: CPT

## 2025-03-31 PROCEDURE — 99285 EMERGENCY DEPT VISIT HI MDM: CPT

## 2025-03-31 PROCEDURE — 71045 X-RAY EXAM CHEST 1 VIEW: CPT

## 2025-03-31 PROCEDURE — 83605 ASSAY OF LACTIC ACID: CPT

## 2025-03-31 PROCEDURE — 6360000002 HC RX W HCPCS: Performed by: INTERNAL MEDICINE

## 2025-03-31 PROCEDURE — 6370000000 HC RX 637 (ALT 250 FOR IP): Performed by: EMERGENCY MEDICINE

## 2025-03-31 PROCEDURE — 82435 ASSAY OF BLOOD CHLORIDE: CPT

## 2025-03-31 PROCEDURE — 80053 COMPREHEN METABOLIC PANEL: CPT

## 2025-03-31 PROCEDURE — 85014 HEMATOCRIT: CPT

## 2025-03-31 PROCEDURE — 94761 N-INVAS EAR/PLS OXIMETRY MLT: CPT

## 2025-03-31 PROCEDURE — 82803 BLOOD GASES ANY COMBINATION: CPT

## 2025-03-31 PROCEDURE — 2580000003 HC RX 258: Performed by: EMERGENCY MEDICINE

## 2025-03-31 PROCEDURE — 82330 ASSAY OF CALCIUM: CPT

## 2025-03-31 PROCEDURE — 87040 BLOOD CULTURE FOR BACTERIA: CPT

## 2025-03-31 PROCEDURE — 94640 AIRWAY INHALATION TREATMENT: CPT

## 2025-03-31 PROCEDURE — 96375 TX/PRO/DX INJ NEW DRUG ADDON: CPT

## 2025-03-31 PROCEDURE — 93010 ELECTROCARDIOGRAM REPORT: CPT | Performed by: INTERNAL MEDICINE

## 2025-03-31 PROCEDURE — 85025 COMPLETE CBC W/AUTO DIFF WBC: CPT

## 2025-03-31 PROCEDURE — 96365 THER/PROPH/DIAG IV INF INIT: CPT

## 2025-03-31 PROCEDURE — 94660 CPAP INITIATION&MGMT: CPT

## 2025-03-31 PROCEDURE — 96368 THER/DIAG CONCURRENT INF: CPT

## 2025-03-31 PROCEDURE — 36600 WITHDRAWAL OF ARTERIAL BLOOD: CPT

## 2025-03-31 PROCEDURE — 82565 ASSAY OF CREATININE: CPT

## 2025-03-31 PROCEDURE — 83880 ASSAY OF NATRIURETIC PEPTIDE: CPT

## 2025-03-31 PROCEDURE — 0202U NFCT DS 22 TRGT SARS-COV-2: CPT

## 2025-03-31 PROCEDURE — 96376 TX/PRO/DX INJ SAME DRUG ADON: CPT

## 2025-03-31 RX ORDER — LABETALOL HYDROCHLORIDE 5 MG/ML
20 INJECTION, SOLUTION INTRAVENOUS ONCE
Status: COMPLETED | OUTPATIENT
Start: 2025-03-31 | End: 2025-03-31

## 2025-03-31 RX ORDER — SODIUM CHLORIDE 0.9 % (FLUSH) 0.9 %
5-40 SYRINGE (ML) INJECTION EVERY 12 HOURS SCHEDULED
Status: DISCONTINUED | OUTPATIENT
Start: 2025-03-31 | End: 2025-04-03 | Stop reason: HOSPADM

## 2025-03-31 RX ORDER — POTASSIUM CHLORIDE 7.45 MG/ML
10 INJECTION INTRAVENOUS PRN
Status: DISCONTINUED | OUTPATIENT
Start: 2025-03-31 | End: 2025-04-03 | Stop reason: HOSPADM

## 2025-03-31 RX ORDER — HYDRALAZINE HYDROCHLORIDE 20 MG/ML
10 INJECTION INTRAMUSCULAR; INTRAVENOUS ONCE
Status: COMPLETED | OUTPATIENT
Start: 2025-03-31 | End: 2025-03-31

## 2025-03-31 RX ORDER — FUROSEMIDE 10 MG/ML
40 INJECTION INTRAMUSCULAR; INTRAVENOUS 2 TIMES DAILY
Status: DISCONTINUED | OUTPATIENT
Start: 2025-03-31 | End: 2025-04-03

## 2025-03-31 RX ORDER — METHYLPREDNISOLONE SODIUM SUCCINATE 125 MG/2ML
125 INJECTION INTRAMUSCULAR; INTRAVENOUS ONCE
Status: COMPLETED | OUTPATIENT
Start: 2025-03-31 | End: 2025-03-31

## 2025-03-31 RX ORDER — ACETAMINOPHEN 650 MG/1
650 SUPPOSITORY RECTAL EVERY 6 HOURS PRN
Status: DISCONTINUED | OUTPATIENT
Start: 2025-03-31 | End: 2025-04-03 | Stop reason: HOSPADM

## 2025-03-31 RX ORDER — SODIUM CHLORIDE 9 MG/ML
INJECTION, SOLUTION INTRAVENOUS PRN
Status: DISCONTINUED | OUTPATIENT
Start: 2025-03-31 | End: 2025-04-03 | Stop reason: HOSPADM

## 2025-03-31 RX ORDER — ONDANSETRON 4 MG/1
4 TABLET, ORALLY DISINTEGRATING ORAL EVERY 8 HOURS PRN
Status: DISCONTINUED | OUTPATIENT
Start: 2025-03-31 | End: 2025-04-03 | Stop reason: HOSPADM

## 2025-03-31 RX ORDER — HYDRALAZINE HYDROCHLORIDE 20 MG/ML
10 INJECTION INTRAMUSCULAR; INTRAVENOUS EVERY 6 HOURS PRN
Status: DISCONTINUED | OUTPATIENT
Start: 2025-03-31 | End: 2025-04-03 | Stop reason: HOSPADM

## 2025-03-31 RX ORDER — POTASSIUM CHLORIDE 1500 MG/1
40 TABLET, EXTENDED RELEASE ORAL PRN
Status: DISCONTINUED | OUTPATIENT
Start: 2025-03-31 | End: 2025-04-03 | Stop reason: HOSPADM

## 2025-03-31 RX ORDER — ENOXAPARIN SODIUM 100 MG/ML
30 INJECTION SUBCUTANEOUS 2 TIMES DAILY
Status: DISCONTINUED | OUTPATIENT
Start: 2025-03-31 | End: 2025-04-03 | Stop reason: HOSPADM

## 2025-03-31 RX ORDER — MAGNESIUM SULFATE IN WATER 40 MG/ML
2000 INJECTION, SOLUTION INTRAVENOUS ONCE
Status: COMPLETED | OUTPATIENT
Start: 2025-03-31 | End: 2025-03-31

## 2025-03-31 RX ORDER — ACETAMINOPHEN 325 MG/1
650 TABLET ORAL EVERY 6 HOURS PRN
Status: DISCONTINUED | OUTPATIENT
Start: 2025-03-31 | End: 2025-04-03 | Stop reason: HOSPADM

## 2025-03-31 RX ORDER — POLYETHYLENE GLYCOL 3350 17 G/17G
17 POWDER, FOR SOLUTION ORAL DAILY PRN
Status: DISCONTINUED | OUTPATIENT
Start: 2025-03-31 | End: 2025-04-03 | Stop reason: HOSPADM

## 2025-03-31 RX ORDER — NITROGLYCERIN 20 MG/100ML
5-200 INJECTION INTRAVENOUS CONTINUOUS
Status: DISCONTINUED | OUTPATIENT
Start: 2025-03-31 | End: 2025-04-01

## 2025-03-31 RX ORDER — IPRATROPIUM BROMIDE AND ALBUTEROL SULFATE 2.5; .5 MG/3ML; MG/3ML
1 SOLUTION RESPIRATORY (INHALATION) PRN
Status: DISCONTINUED | OUTPATIENT
Start: 2025-03-31 | End: 2025-04-01

## 2025-03-31 RX ORDER — FUROSEMIDE 10 MG/ML
60 INJECTION INTRAMUSCULAR; INTRAVENOUS ONCE
Status: COMPLETED | OUTPATIENT
Start: 2025-03-31 | End: 2025-03-31

## 2025-03-31 RX ORDER — MAGNESIUM SULFATE IN WATER 40 MG/ML
2000 INJECTION, SOLUTION INTRAVENOUS PRN
Status: DISCONTINUED | OUTPATIENT
Start: 2025-03-31 | End: 2025-04-03 | Stop reason: HOSPADM

## 2025-03-31 RX ORDER — ONDANSETRON 2 MG/ML
4 INJECTION INTRAMUSCULAR; INTRAVENOUS EVERY 6 HOURS PRN
Status: DISCONTINUED | OUTPATIENT
Start: 2025-03-31 | End: 2025-04-03 | Stop reason: HOSPADM

## 2025-03-31 RX ORDER — SODIUM CHLORIDE 0.9 % (FLUSH) 0.9 %
5-40 SYRINGE (ML) INJECTION PRN
Status: DISCONTINUED | OUTPATIENT
Start: 2025-03-31 | End: 2025-04-03 | Stop reason: HOSPADM

## 2025-03-31 RX ORDER — 0.9 % SODIUM CHLORIDE 0.9 %
30 INTRAVENOUS SOLUTION INTRAVENOUS ONCE
Status: DISCONTINUED | OUTPATIENT
Start: 2025-03-31 | End: 2025-03-31

## 2025-03-31 RX ORDER — LABETALOL HYDROCHLORIDE 5 MG/ML
10 INJECTION, SOLUTION INTRAVENOUS EVERY 4 HOURS PRN
Status: DISCONTINUED | OUTPATIENT
Start: 2025-03-31 | End: 2025-04-03 | Stop reason: HOSPADM

## 2025-03-31 RX ADMIN — VANCOMYCIN HYDROCHLORIDE 2500 MG: 1.5 INJECTION, POWDER, LYOPHILIZED, FOR SOLUTION INTRAVENOUS at 15:12

## 2025-03-31 RX ADMIN — IPRATROPIUM BROMIDE AND ALBUTEROL SULFATE 1 DOSE: .5; 2.5 SOLUTION RESPIRATORY (INHALATION) at 12:19

## 2025-03-31 RX ADMIN — HYDRALAZINE HYDROCHLORIDE 10 MG: 20 INJECTION INTRAMUSCULAR; INTRAVENOUS at 16:25

## 2025-03-31 RX ADMIN — IPRATROPIUM BROMIDE AND ALBUTEROL SULFATE 1 DOSE: .5; 2.5 SOLUTION RESPIRATORY (INHALATION) at 12:05

## 2025-03-31 RX ADMIN — LABETALOL HYDROCHLORIDE 20 MG: 5 INJECTION, SOLUTION INTRAVENOUS at 16:26

## 2025-03-31 RX ADMIN — FUROSEMIDE 60 MG: 10 INJECTION, SOLUTION INTRAMUSCULAR; INTRAVENOUS at 12:45

## 2025-03-31 RX ADMIN — ENOXAPARIN SODIUM 30 MG: 100 INJECTION SUBCUTANEOUS at 20:59

## 2025-03-31 RX ADMIN — METHYLPREDNISOLONE SODIUM SUCCINATE 125 MG: 125 INJECTION INTRAMUSCULAR; INTRAVENOUS at 12:42

## 2025-03-31 RX ADMIN — HYDRALAZINE HYDROCHLORIDE 10 MG: 20 INJECTION INTRAMUSCULAR; INTRAVENOUS at 12:36

## 2025-03-31 RX ADMIN — NITROGLYCERIN 5 MCG/MIN: 20 INJECTION INTRAVENOUS at 12:53

## 2025-03-31 RX ADMIN — FUROSEMIDE 40 MG: 10 INJECTION, SOLUTION INTRAMUSCULAR; INTRAVENOUS at 16:25

## 2025-03-31 RX ADMIN — PIPERACILLIN SODIUM AND TAZOBACTAM SODIUM 4500 MG: 4; .5 INJECTION, POWDER, LYOPHILIZED, FOR SOLUTION INTRAVENOUS at 13:15

## 2025-03-31 RX ADMIN — IPRATROPIUM BROMIDE AND ALBUTEROL SULFATE 1 DOSE: .5; 2.5 SOLUTION RESPIRATORY (INHALATION) at 12:13

## 2025-03-31 RX ADMIN — MAGNESIUM SULFATE IN WATER 2000 MG: 40 INJECTION, SOLUTION INTRAVENOUS at 12:41

## 2025-03-31 RX ADMIN — LABETALOL HYDROCHLORIDE 20 MG: 5 INJECTION, SOLUTION INTRAVENOUS at 12:25

## 2025-03-31 ASSESSMENT — PAIN SCALES - GENERAL: PAINLEVEL_OUTOF10: 2

## 2025-03-31 ASSESSMENT — PAIN DESCRIPTION - LOCATION: LOCATION: CHEST

## 2025-03-31 ASSESSMENT — ENCOUNTER SYMPTOMS
VOMITING: 0
CHEST TIGHTNESS: 1
COUGH: 1
SHORTNESS OF BREATH: 1
EYE PAIN: 0
NAUSEA: 0
ABDOMINAL PAIN: 0
WHEEZING: 1
SORE THROAT: 0

## 2025-03-31 ASSESSMENT — PAIN DESCRIPTION - ORIENTATION: ORIENTATION: MID

## 2025-03-31 ASSESSMENT — PAIN - FUNCTIONAL ASSESSMENT
PAIN_FUNCTIONAL_ASSESSMENT: 0-10
PAIN_FUNCTIONAL_ASSESSMENT: NONE - DENIES PAIN

## 2025-03-31 ASSESSMENT — PAIN DESCRIPTION - PAIN TYPE: TYPE: ACUTE PAIN

## 2025-03-31 NOTE — CARE COORDINATION
Case Management Assessment  Initial Evaluation    Date/Time of Evaluation: 3/31/2025 3:44 PM  Assessment Completed by: TANA Veliz    If patient is discharged prior to next notation, then this note serves as note for discharge by case management.    Patient Name: Siddhartha Snyder                   YOB: 1970  Diagnosis: Pulmonary edema cardiac cause (HCC) [I50.1]                   Date / Time: 3/31/2025 11:30 AM    Patient Admission Status: Inpatient   Readmission Risk (Low < 19, Mod (19-27), High > 27): No data recorded  Current PCP: No primary care provider on file.  PCP verified by CM?      Chart Reviewed: Yes      History Provided by: Patient  Patient Orientation: Alert and Oriented, Person, Place, Situation, Self    Patient Cognition: Alert    Hospitalization in the last 30 days (Readmission):  No    If yes, Readmission Assessment in  Navigator will be completed.    Advance Directives:      Code Status: Full Code   Patient's Primary Decision Maker is: Legal Next of Kin      Discharge Planning:    Patient lives with:   Type of Home:    Primary Care Giver: Self  Patient Support Systems include: Family Members (Yen willett)   Current Financial resources:    Current community resources:    Current services prior to admission:              Current DME:              Type of Home Care services:       ADLS  Prior functional level: Independent in ADLs/IADLs  Current functional level: Other (see comment) (TBD)    PT AM-PAC:   /24  OT AM-PAC:   /24    Family can provide assistance at DC: Yes  Would you like Case Management to discuss the discharge plan with any other family members/significant others, and if so, who? Yes (sister)  Plans to Return to Present Housing: Unknown at present  Other Identified Issues/Barriers to RETURNING to current housing: Medical complications  Potential Assistance needed at discharge:              Potential DME:    Patient expects to discharge to:    Plan for

## 2025-03-31 NOTE — ED TRIAGE NOTES
Pt presents to ER via self through lobby, straight back to room 24 for c/o sob. States associated cp center of chest 2/10.  Pt denies pulm/cardiac hx.  Pt states cough x1 week, SOB exacerbation starting yesterday.  Pt states \"passed out at about 940a this morning for 15 minutes.\"  Pt denies injuries sustained from syncope, unwitnessed.  States light-headed kendra

## 2025-03-31 NOTE — ED PROVIDER NOTES
[facility] TELEMETRY  EMERGENCY DEPARTMENT ENCOUNTER      Pt Name: Siddhartha Snyder  MRN: [id number]  Birthdate 1970  Date of evaluation: 3/31/2025  Provider: Estelita Stratton DO  11:57 AM EDT    CHIEF COMPLAINT       Chief Complaint   Patient presents with    Shortness of Breath         HISTORY OF PRESENT ILLNESS   (Location/Symptom, Timing/Onset, Context/Setting, Quality, Duration, Modifying Factors, Severity)  Note limiting factors.   Siddhartha Snyder is a 54 y.o. male who presents to the emergency department .  Patient presents with shortness of breath that started 2 days ago.  Cough congestion.  Patient does not really know what medical problems he has because he does not see a doctor.  He is on no medications.  Patient states that last night he had to sit up in order to breathe.  Family history positive for hypertension.    HPI    Nursing Notes were reviewed.    REVIEW OF SYSTEMS    (2-9 systems for level 4, 10 or more for level 5)     Review of Systems   Constitutional:  Negative for activity change, appetite change, fatigue and fever.   HENT:  Positive for congestion. Negative for sore throat.    Eyes:  Negative for pain and visual disturbance.   Respiratory:  Positive for cough, chest tightness, shortness of breath and wheezing.    Cardiovascular:  Negative for chest pain.   Gastrointestinal:  Negative for abdominal pain, nausea and vomiting.   Endocrine: Negative for polydipsia.   Genitourinary:  Negative for flank pain and urgency.   Musculoskeletal:  Negative for gait problem and neck stiffness.   Skin:  Negative for rash.   Neurological:  Negative for weakness, light-headedness and headaches.   Psychiatric/Behavioral:  Negative for confusion and sleep disturbance.        Except as noted above the remainder of the review of systems was reviewed and negative.       PAST MEDICAL HISTORY   History reviewed. No pertinent past medical history.      SURGICAL HISTORY       Past Surgical History:   Procedure Laterality

## 2025-03-31 NOTE — ED NOTES
Pt off the bipap. Pt placed on 3liter NC. 10min in pt desat to 89-90%. Pt was placed on 5liter NC pt sat at this time 95% on 5L

## 2025-03-31 NOTE — CARE COORDINATION
Advance Care Planning   Healthcare Decision Maker:      Click here to complete Healthcare Decision Makers including selection of the Healthcare Decision Maker Relationship (ie \"Primary\").          Electronically signed by TANA Veliz on 3/31/25 at 3:55 PM EDT

## 2025-03-31 NOTE — H&P
Patient is a 55-year-old man with no medication at home comes in here for shortness of with on and off.  Found to have hypertensive urgency with pulm edema on chest x-ray started on nitro drip received Lasix and hydralazine with improvement of his blood pressure.  Patient also was in acute respiratory failure was placed on BiPAP.  Patient does not use oxygen at home.  Denies any chest pain        No past medical history on file.  Past Surgical History:   Procedure Laterality Date    APPENDECTOMY  1976    LITHOTRIPSY Left 4/14/2021    LEFT ESWL EXTRACORPOREAL SHOCK WAVE LITHOTRIPSY performed by Casper Santa MD at [facility] [place] OR     Social Connections: Not on file     Family History   Problem Relation Age of Onset    Diabetes Mother     Heart Disease Mother     Cancer Mother     Diabetes Father     Diabetes Brother      No current facility-administered medications on file prior to encounter.     No current outpatient medications on file prior to encounter.     Lab Results   Component Value Date    WBC 11.6 (H) 03/31/2025    HGB 14.9 03/31/2025    HCT 42.4 03/31/2025    MCV 78.2 (L) 03/31/2025     03/31/2025     Lab Results   Component Value Date/Time     03/31/2025 12:20 PM    K 4.1 03/31/2025 12:20 PM    CL 91 03/31/2025 12:20 PM    CO2 19 03/31/2025 12:20 PM    BUN 19 03/31/2025 12:20 PM    CREATININE 1.8 03/31/2025 12:50 PM    CREATININE 2.12 03/31/2025 12:20 PM    GLUCOSE 273 03/31/2025 12:20 PM    GLUCOSE 137 02/02/2020 11:15 PM    CALCIUM 8.8 03/31/2025 12:20 PM    LABGLOM 44 03/31/2025 12:50 PM      ROS: 12 point review of systems negative except was stated in HPI  HEENT: AT/NC, PERRLA, no JVD  Neck: supple and midline  HEART: s1/s2 wnl w/o s3  LUNG: + rales, decrease BS  ABD: soft, NT  EXT: + edema,   SKin : no rash  Neuro:no focal deficits, alert  Mood: appropiate  1) pulmonary edema  2) CHF exacerbation, new onset  3) HTN urgency  4) acute hypoxix respiratory failure due to   Above  5)

## 2025-04-01 ENCOUNTER — APPOINTMENT (OUTPATIENT)
Age: 55
DRG: 286 | End: 2025-04-01
Attending: INTERNAL MEDICINE
Payer: COMMERCIAL

## 2025-04-01 PROBLEM — I50.9 HEART FAILURE: Status: ACTIVE | Noted: 2025-04-01

## 2025-04-01 LAB
ANION GAP SERPL CALCULATED.3IONS-SCNC: 15 MEQ/L (ref 9–15)
BASOPHILS # BLD: 0 K/UL (ref 0–0.2)
BASOPHILS NFR BLD: 0.1 %
BUN SERPL-MCNC: 20 MG/DL (ref 6–20)
CALCIUM SERPL-MCNC: 8.8 MG/DL (ref 8.5–9.9)
CHLORIDE SERPL-SCNC: 96 MEQ/L (ref 95–107)
CHOLEST SERPL-MCNC: 124 MG/DL (ref 0–199)
CO2 SERPL-SCNC: 22 MEQ/L (ref 20–31)
CREAT SERPL-MCNC: 1.2 MG/DL (ref 0.7–1.2)
ECHO AO ROOT DIAM: 3 CM
ECHO AO ROOT INDEX: 1.32 CM/M2
ECHO AR MAX VEL PISA: 4.1 M/S
ECHO AV AREA PEAK VELOCITY: 1.7 CM2
ECHO AV AREA VTI: 1.9 CM2
ECHO AV AREA/BSA PEAK VELOCITY: 0.7 CM2/M2
ECHO AV AREA/BSA VTI: 0.8 CM2/M2
ECHO AV MEAN GRADIENT: 5 MMHG
ECHO AV MEAN VELOCITY: 1.1 M/S
ECHO AV PEAK GRADIENT: 9 MMHG
ECHO AV PEAK VELOCITY: 1.7 M/S
ECHO AV REGURGITANT PHT: 370.3 MS
ECHO AV VELOCITY RATIO: 0.53
ECHO AV VTI: 26.4 CM
ECHO BSA: 2.38 M2
ECHO EST RA PRESSURE: 8 MMHG
ECHO LA DIAMETER INDEX: 2.46 CM/M2
ECHO LA DIAMETER: 5.6 CM
ECHO LA TO AORTIC ROOT RATIO: 1.87
ECHO LA VOL A-L A2C: 90 ML (ref 18–58)
ECHO LA VOL A-L A4C: 109 ML (ref 18–58)
ECHO LA VOL MOD A2C: 86 ML (ref 18–58)
ECHO LA VOL MOD A4C: 104 ML (ref 18–58)
ECHO LA VOLUME AREA LENGTH: 100 ML
ECHO LA VOLUME INDEX A-L A2C: 39 ML/M2 (ref 16–34)
ECHO LA VOLUME INDEX A-L A4C: 48 ML/M2 (ref 16–34)
ECHO LA VOLUME INDEX AREA LENGTH: 44 ML/M2 (ref 16–34)
ECHO LA VOLUME INDEX MOD A2C: 38 ML/M2 (ref 16–34)
ECHO LA VOLUME INDEX MOD A4C: 46 ML/M2 (ref 16–34)
ECHO LV EF PHYSICIAN: 15 %
ECHO LV FRACTIONAL SHORTENING: 3 % (ref 28–44)
ECHO LV INTERNAL DIMENSION DIASTOLE INDEX: 2.72 CM/M2
ECHO LV INTERNAL DIMENSION DIASTOLIC: 6.2 CM (ref 4.2–5.9)
ECHO LV INTERNAL DIMENSION SYSTOLIC INDEX: 2.63 CM/M2
ECHO LV INTERNAL DIMENSION SYSTOLIC: 6 CM
ECHO LV IVSD: 1.2 CM (ref 0.6–1)
ECHO LV IVSS: 1 CM
ECHO LV MASS 2D: 493.4 G (ref 88–224)
ECHO LV MASS INDEX 2D: 216.4 G/M2 (ref 49–115)
ECHO LV POSTERIOR WALL DIASTOLIC: 2 CM (ref 0.6–1)
ECHO LV POSTERIOR WALL SYSTOLIC: 2.1 CM
ECHO LV RELATIVE WALL THICKNESS RATIO: 0.65
ECHO LVOT AREA: 2.8 CM2
ECHO LVOT AV VTI INDEX: 0.63
ECHO LVOT DIAM: 1.9 CM
ECHO LVOT MEAN GRADIENT: 1 MMHG
ECHO LVOT PEAK GRADIENT: 2 MMHG
ECHO LVOT PEAK VELOCITY: 0.9 M/S
ECHO LVOT STROKE VOLUME INDEX: 20.8 ML/M2
ECHO LVOT SV: 47.3 ML
ECHO LVOT VTI: 16.7 CM
ECHO MV REGURGITANT PEAK GRADIENT: 71 MMHG
ECHO MV REGURGITANT PEAK VELOCITY: 4.2 M/S
ECHO PULMONARY ARTERY END DIASTOLIC PRESSURE: 9 MMHG
ECHO PV MAX VELOCITY: 0.9 M/S
ECHO PV PEAK GRADIENT: 3 MMHG
ECHO PV REGURGITANT MAX VELOCITY: 1.5 M/S
ECHO RIGHT VENTRICULAR SYSTOLIC PRESSURE (RVSP): 64 MMHG
ECHO RV INTERNAL DIMENSION: 3.9 CM
ECHO RV TAPSE: 2.1 CM (ref 1.7–?)
ECHO TV REGURGITANT MAX VELOCITY: 3.74 M/S
ECHO TV REGURGITANT PEAK GRADIENT: 56 MMHG
EOSINOPHIL # BLD: 0 K/UL (ref 0–0.7)
EOSINOPHIL NFR BLD: 0 %
ERYTHROCYTE [DISTWIDTH] IN BLOOD BY AUTOMATED COUNT: 15.8 % (ref 11.5–14.5)
ESTIMATED AVERAGE GLUCOSE: 128 MG/DL
ESTIMATED AVERAGE GLUCOSE: 128 MG/DL
GLUCOSE SERPL-MCNC: 134 MG/DL (ref 70–99)
HBA1C MFR BLD: 6.1 % (ref 4–6)
HBA1C MFR BLD: 6.1 % (ref 4–6)
HCT VFR BLD AUTO: 39.3 % (ref 42–52)
HDLC SERPL-MCNC: 35 MG/DL (ref 40–59)
HGB BLD-MCNC: 11.9 G/DL (ref 14–18)
LDLC SERPL CALC-MCNC: 78 MG/DL (ref 0–129)
LYMPHOCYTES # BLD: 0.9 K/UL (ref 1–4.8)
LYMPHOCYTES NFR BLD: 6.3 %
MCH RBC QN AUTO: 22.8 PG (ref 27–31.3)
MCHC RBC AUTO-ENTMCNC: 30.3 % (ref 33–37)
MCV RBC AUTO: 75.1 FL (ref 79–92.2)
MONOCYTES # BLD: 0.9 K/UL (ref 0.2–0.8)
MONOCYTES NFR BLD: 6.1 %
NEUTROPHILS # BLD: 12.4 K/UL (ref 1.4–6.5)
NEUTS SEG NFR BLD: 87.1 %
PLATELET # BLD AUTO: 277 K/UL (ref 130–400)
POTASSIUM SERPL-SCNC: 4.7 MEQ/L (ref 3.4–4.9)
RBC # BLD AUTO: 5.23 M/UL (ref 4.7–6.1)
SODIUM SERPL-SCNC: 133 MEQ/L (ref 135–144)
TRIGL SERPL-MCNC: 57 MG/DL (ref 0–150)
WBC # BLD AUTO: 14.2 K/UL (ref 4.8–10.8)

## 2025-04-01 PROCEDURE — 2060000000 HC ICU INTERMEDIATE R&B

## 2025-04-01 PROCEDURE — 6370000000 HC RX 637 (ALT 250 FOR IP): Performed by: INTERNAL MEDICINE

## 2025-04-01 PROCEDURE — 80048 BASIC METABOLIC PNL TOTAL CA: CPT

## 2025-04-01 PROCEDURE — 2500000003 HC RX 250 WO HCPCS: Performed by: INTERNAL MEDICINE

## 2025-04-01 PROCEDURE — 6360000002 HC RX W HCPCS: Performed by: INTERNAL MEDICINE

## 2025-04-01 PROCEDURE — 83036 HEMOGLOBIN GLYCOSYLATED A1C: CPT

## 2025-04-01 PROCEDURE — 6360000004 HC RX CONTRAST MEDICATION: Performed by: INTERNAL MEDICINE

## 2025-04-01 PROCEDURE — C8929 TTE W OR WO FOL WCON,DOPPLER: HCPCS

## 2025-04-01 PROCEDURE — 2700000000 HC OXYGEN THERAPY PER DAY

## 2025-04-01 PROCEDURE — 36415 COLL VENOUS BLD VENIPUNCTURE: CPT

## 2025-04-01 PROCEDURE — APPSS45 APP SPLIT SHARED TIME 31-45 MINUTES

## 2025-04-01 PROCEDURE — 99223 1ST HOSP IP/OBS HIGH 75: CPT | Performed by: INTERNAL MEDICINE

## 2025-04-01 PROCEDURE — 80061 LIPID PANEL: CPT

## 2025-04-01 PROCEDURE — 85025 COMPLETE CBC W/AUTO DIFF WBC: CPT

## 2025-04-01 RX ORDER — HYDRALAZINE HYDROCHLORIDE 20 MG/ML
10 INJECTION INTRAMUSCULAR; INTRAVENOUS EVERY 6 HOURS PRN
Status: DISCONTINUED | OUTPATIENT
Start: 2025-04-01 | End: 2025-04-01

## 2025-04-01 RX ORDER — LOSARTAN POTASSIUM 50 MG/1
50 TABLET ORAL DAILY
Status: DISCONTINUED | OUTPATIENT
Start: 2025-04-01 | End: 2025-04-03

## 2025-04-01 RX ORDER — HYDRALAZINE HYDROCHLORIDE 25 MG/1
25 TABLET, FILM COATED ORAL EVERY 6 HOURS SCHEDULED
Status: DISCONTINUED | OUTPATIENT
Start: 2025-04-01 | End: 2025-04-01

## 2025-04-01 RX ORDER — LOSARTAN POTASSIUM 25 MG/1
25 TABLET ORAL DAILY
Status: DISCONTINUED | OUTPATIENT
Start: 2025-04-01 | End: 2025-04-01

## 2025-04-01 RX ADMIN — FUROSEMIDE 40 MG: 10 INJECTION, SOLUTION INTRAMUSCULAR; INTRAVENOUS at 17:45

## 2025-04-01 RX ADMIN — SULFUR HEXAFLUORIDE 2 ML: KIT at 16:20

## 2025-04-01 RX ADMIN — SODIUM CHLORIDE, PRESERVATIVE FREE 10 ML: 5 INJECTION INTRAVENOUS at 21:15

## 2025-04-01 RX ADMIN — ENOXAPARIN SODIUM 30 MG: 100 INJECTION SUBCUTANEOUS at 08:55

## 2025-04-01 RX ADMIN — FUROSEMIDE 40 MG: 10 INJECTION, SOLUTION INTRAMUSCULAR; INTRAVENOUS at 08:55

## 2025-04-01 RX ADMIN — SODIUM CHLORIDE, PRESERVATIVE FREE 10 ML: 5 INJECTION INTRAVENOUS at 08:55

## 2025-04-01 RX ADMIN — LOSARTAN POTASSIUM 50 MG: 50 TABLET, FILM COATED ORAL at 11:20

## 2025-04-01 RX ADMIN — ENOXAPARIN SODIUM 30 MG: 100 INJECTION SUBCUTANEOUS at 21:15

## 2025-04-01 ASSESSMENT — ENCOUNTER SYMPTOMS
DIARRHEA: 0
VOMITING: 0
CHEST TIGHTNESS: 0
SHORTNESS OF BREATH: 1
SHORTNESS OF BREATH: 0
WHEEZING: 0
COUGH: 0
NAUSEA: 0

## 2025-04-01 ASSESSMENT — PAIN SCALES - GENERAL
PAINLEVEL_OUTOF10: 0
PAINLEVEL_OUTOF10: 0

## 2025-04-01 NOTE — CARE COORDINATION
The pharmacy resident and I met with the patient and his sister.   Definition of congestive heart failure explained.   Common causes such as arrhythmias, valve disease, alcohol and drug use, coronary artery disease, hypertension, previous MI, and sleep apnea discussed. I encouraged the patient to speak with their cardiologist to determine what caused their failure.  Signs and symptoms of CHF, such as fatigue, cough, shortness of breath, edema, weight gain reviewed with patient. Patient reports that their symptoms were shortness of breath, palpitations, and near syncope.  We talked about the importance of medication compliance.   Low sodium diet of 2,000 mg/day encouraged. Nutrition label reviewed with patient with explanation that the ingredient amounts are listed per serving. Patient was shown how to calculate the number of milligrams of sodium on the nutrition label. Hints for decreasing sodium intake provided such as avoiding canned or processed foods. Fresh meats, vegetables, and fruits encouraged. We also discussed salt free seasonings and cautious use of salt substitutes. The patient is to ask his/her cardiologist if these are safe for them. A general shopping guide was also provided with food choice recommendations that assist with maintaining a low sodium diet.   A low-fat diet was also encouraged.  Hints for avoiding high sodium meals at a restaurant were provided. These included: ordering fresh salads and fruits, food without seasoning (bring their own salt free seasonings), avoiding fried foods, and asking for sauce on the side. The patient may also ask the  for healthier choices, but must understand that the  is not necessarily an expert.   I encouraged the patient to remain active and also be aware of their limitations. Before starting an exercise regimen, they should consult with their doctor. If their activity tolerance begins to decrease, they should call their doctor right away.   Stress

## 2025-04-01 NOTE — FLOWSHEET NOTE
1230- Assumed care with pt from MOIZ Philip    1620- Echo obtained    1815- Pt awake. Up in chair. Call light in reach.    Electronically signed by Mark Crowell RN on 4/1/2025 at 7:31 PM

## 2025-04-01 NOTE — H&P (VIEW-ONLY)
Lasix 40 mg IV twice daily  Wean nitro drip  Will need to add oral antihypertensive   Patient will need outpatient cardiology follow-up once discharged   Continue to monitor on telemetry for any arrhythmias  Continue to monitor electrolytes and hemoglobin  Further recommendations per Dr. Bella        Thank you for allowing me to participate in the care of your patient, please don't hesitate to contact me if you have any further questions.      Electronically signed by Asaf Hughes PA-C on 4/1/2025 at 9:45 AM    Attending Supervising Physician's Attestation Statement  I performed a history and physical examination on the patient and discussed the management with the physician assistant. I reviewed and agree with the findings and plan as documented in her note .    ID:   Patient is a 54 y.o. male Pmhx of hypertension not on medications who presents with a chief complaint of shortness of breath. Patient is followed on a regular basis by Dr. Morrison primary care provider on file.    Cardiology was consulted for CHF exacerbation.    Physical exam  General: Alert oriented x4 no acute distress  Lungs: Bibasilar crackles  CV: Regular rate and rhythm no murmurs  Extremities: 1+ pitting edema bilaterally    Assessment plan:     Acute decompensated heart failure unknown ejection fraction  At admission chest x-ray bilateral pulmonary congestion, 1+ pitting edema bilaterally, BNP 4000  Continue Lasix currently 40 mg IV twice daily  Monitor creatinine daily  Patient was on nitro drip in the ER.  Currently DC'd  Follow-up echocardiogram  No beta-blocker since patient is still in decompensated state, beta-blocker naïve, patient not on beta-blocker prior to this admission    Elevated troponins  47, 41  Likely related to heart failure   Ischemic evaluation pending echocardiogram, if EF is reduced recommend coronary angiogram before discharge otherwise ischemic evaluation as an outpatient    Hypertension  Start losartan 50 mg

## 2025-04-01 NOTE — CONSULTS
DATE OF CONSULTATION  4/1/2025    CONSULTANT  Dr. Bella     REQUESTING PHYSICIAN  Stephen Neff MD     PRIMARY CARDIOLOGIST    REASON FOR CONSULTATION  Chief Complaint   Patient presents with    Shortness of Breath       Hospital Day: 1       Patient is a 54 y.o. male Pmhx of hypertension not on medications who presents with a chief complaint of shortness of breath. Patient is followed on a regular basis by Dr. Morrison primary care provider on file.    Cardiology was consulted for CHF exacerbation.    Patient reporting shortness of breath x 1 day.  Patient also reporting some midsternal nonradiating chest pain mainly with coughing.  Has received a breathing treatment with improvement.  Does not wear oxygen at home.  States that he is pretty active at work without symptoms.  Patient reports that he only sleeps 1 to 2 hours a night.  Was told that he snores at night.   Has not been seen by a doctor for years.  Does not take any medications.  + dizziness/lightheadedness, swelling in lower extremities x2 weeks   States that he was feeling dizzy/lightheaded and laid down on the couch.   Denies any nausea, vomiting, syncope.     Family history of diabetes and heart disease.  Social alcohol use.  Denies any smoking or illicit drug use.  Denies any prior MI's, CVA's, CABGs, cardiac catheterizations.    Troponin: 47, 41  BNP 4040  Chest x-ray: Diffuse hazy density worse on the right.  Findings may be due to pulmonary edema or pneumonia.      History reviewed. No pertinent past medical history.   Patient Active Problem List   Diagnosis    Calculus of kidney    Pulmonary edema cardiac cause (HCC)       Past Surgical History:   Procedure Laterality Date    APPENDECTOMY  1976    LITHOTRIPSY Left 4/14/2021    LEFT ESWL EXTRACORPOREAL SHOCK WAVE LITHOTRIPSY [place] Casper Santa [place] [facility] [place]       Social History     Socioeconomic History    Marital status: Single     Spouse name: None    Number of children: None    Years

## 2025-04-02 PROBLEM — I42.9 CARDIOMYOPATHY: Status: ACTIVE | Noted: 2025-03-31

## 2025-04-02 LAB
ANION GAP SERPL CALCULATED.3IONS-SCNC: 10 MEQ/L (ref 9–15)
BASOPHILS # BLD: 0 K/UL (ref 0–0.2)
BASOPHILS NFR BLD: 0.3 %
BUN SERPL-MCNC: 29 MG/DL (ref 6–20)
CALCIUM SERPL-MCNC: 9.2 MG/DL (ref 8.5–9.9)
CHLORIDE SERPL-SCNC: 98 MEQ/L (ref 95–107)
CO2 SERPL-SCNC: 28 MEQ/L (ref 20–31)
CREAT SERPL-MCNC: 1.33 MG/DL (ref 0.7–1.2)
EOSINOPHIL # BLD: 0.1 K/UL (ref 0–0.7)
EOSINOPHIL NFR BLD: 0.6 %
ERYTHROCYTE [DISTWIDTH] IN BLOOD BY AUTOMATED COUNT: 16.3 % (ref 11.5–14.5)
GLUCOSE SERPL-MCNC: 94 MG/DL (ref 70–99)
HCT VFR BLD AUTO: 41.1 % (ref 42–52)
HGB BLD-MCNC: 12 G/DL (ref 14–18)
LYMPHOCYTES # BLD: 1.8 K/UL (ref 1–4.8)
LYMPHOCYTES NFR BLD: 17.6 %
MCH RBC QN AUTO: 22.6 PG (ref 27–31.3)
MCHC RBC AUTO-ENTMCNC: 29.2 % (ref 33–37)
MCV RBC AUTO: 77.4 FL (ref 79–92.2)
MONOCYTES # BLD: 1.1 K/UL (ref 0.2–0.8)
MONOCYTES NFR BLD: 11.4 %
NEUTROPHILS # BLD: 6.9 K/UL (ref 1.4–6.5)
NEUTS SEG NFR BLD: 69.6 %
PLATELET # BLD AUTO: 290 K/UL (ref 130–400)
POTASSIUM SERPL-SCNC: 4.3 MEQ/L (ref 3.4–4.9)
RBC # BLD AUTO: 5.31 M/UL (ref 4.7–6.1)
SODIUM SERPL-SCNC: 136 MEQ/L (ref 135–144)
WBC # BLD AUTO: 9.9 K/UL (ref 4.8–10.8)

## 2025-04-02 PROCEDURE — 85025 COMPLETE CBC W/AUTO DIFF WBC: CPT

## 2025-04-02 PROCEDURE — 2500000003 HC RX 250 WO HCPCS: Performed by: INTERNAL MEDICINE

## 2025-04-02 PROCEDURE — 99232 SBSQ HOSP IP/OBS MODERATE 35: CPT | Performed by: INTERNAL MEDICINE

## 2025-04-02 PROCEDURE — 80048 BASIC METABOLIC PNL TOTAL CA: CPT

## 2025-04-02 PROCEDURE — 6370000000 HC RX 637 (ALT 250 FOR IP): Performed by: INTERNAL MEDICINE

## 2025-04-02 PROCEDURE — 2060000000 HC ICU INTERMEDIATE R&B

## 2025-04-02 PROCEDURE — 6360000002 HC RX W HCPCS: Performed by: INTERNAL MEDICINE

## 2025-04-02 PROCEDURE — 36415 COLL VENOUS BLD VENIPUNCTURE: CPT

## 2025-04-02 PROCEDURE — 2700000000 HC OXYGEN THERAPY PER DAY

## 2025-04-02 RX ADMIN — SODIUM CHLORIDE, PRESERVATIVE FREE 10 ML: 5 INJECTION INTRAVENOUS at 20:02

## 2025-04-02 RX ADMIN — ENOXAPARIN SODIUM 30 MG: 100 INJECTION SUBCUTANEOUS at 08:16

## 2025-04-02 RX ADMIN — FUROSEMIDE 40 MG: 10 INJECTION, SOLUTION INTRAMUSCULAR; INTRAVENOUS at 17:48

## 2025-04-02 RX ADMIN — SODIUM CHLORIDE, PRESERVATIVE FREE 10 ML: 5 INJECTION INTRAVENOUS at 08:32

## 2025-04-02 RX ADMIN — ENOXAPARIN SODIUM 30 MG: 100 INJECTION SUBCUTANEOUS at 20:01

## 2025-04-02 RX ADMIN — FUROSEMIDE 40 MG: 10 INJECTION, SOLUTION INTRAMUSCULAR; INTRAVENOUS at 08:16

## 2025-04-02 RX ADMIN — EMPAGLIFLOZIN 10 MG: 10 TABLET, FILM COATED ORAL at 16:21

## 2025-04-02 RX ADMIN — LOSARTAN POTASSIUM 50 MG: 50 TABLET, FILM COATED ORAL at 08:16

## 2025-04-02 ASSESSMENT — ENCOUNTER SYMPTOMS
NAUSEA: 0
SHORTNESS OF BREATH: 1
VOMITING: 0
DIARRHEA: 0
WHEEZING: 0
CHEST TIGHTNESS: 0
SHORTNESS OF BREATH: 0
COUGH: 0

## 2025-04-02 NOTE — CARE COORDINATION
LIFE VEST ORDER FAXED TO [facility]. TANISHA AT [facility] NOTIFIED.

## 2025-04-02 NOTE — CARE COORDINATION
AWAITING CARDIOLOGY PLAN. PT ON 2LNC.  DC PLAN REMAINS HOME ONCE MEDICALLY CLEARED AND WILL NEED HOME 02 EVAL.

## 2025-04-03 VITALS
BODY MASS INDEX: 39.4 KG/M2 | TEMPERATURE: 98.6 F | WEIGHT: 260 LBS | HEART RATE: 112 BPM | OXYGEN SATURATION: 99 % | RESPIRATION RATE: 21 BRPM | HEIGHT: 68 IN | DIASTOLIC BLOOD PRESSURE: 81 MMHG | SYSTOLIC BLOOD PRESSURE: 141 MMHG

## 2025-04-03 LAB
ANION GAP SERPL CALCULATED.3IONS-SCNC: 9 MEQ/L (ref 9–15)
BASOPHILS # BLD: 0 K/UL (ref 0–0.2)
BASOPHILS NFR BLD: 0.4 %
BUN SERPL-MCNC: 30 MG/DL (ref 6–20)
CALCIUM SERPL-MCNC: 9.1 MG/DL (ref 8.5–9.9)
CHLORIDE SERPL-SCNC: 98 MEQ/L (ref 95–107)
CO2 SERPL-SCNC: 29 MEQ/L (ref 20–31)
CREAT SERPL-MCNC: 1.42 MG/DL (ref 0.7–1.2)
EOSINOPHIL # BLD: 0.1 K/UL (ref 0–0.7)
EOSINOPHIL NFR BLD: 1.3 %
ERYTHROCYTE [DISTWIDTH] IN BLOOD BY AUTOMATED COUNT: 16.2 % (ref 11.5–14.5)
GLUCOSE SERPL-MCNC: 107 MG/DL (ref 70–99)
HCT VFR BLD AUTO: 40.9 % (ref 42–52)
HGB BLD-MCNC: 12.1 G/DL (ref 14–18)
LYMPHOCYTES # BLD: 1.7 K/UL (ref 1–4.8)
LYMPHOCYTES NFR BLD: 17.5 %
MCH RBC QN AUTO: 22.5 PG (ref 27–31.3)
MCHC RBC AUTO-ENTMCNC: 29.6 % (ref 33–37)
MCV RBC AUTO: 76 FL (ref 79–92.2)
MONOCYTES # BLD: 1.1 K/UL (ref 0.2–0.8)
MONOCYTES NFR BLD: 11.6 %
NEUTROPHILS # BLD: 6.7 K/UL (ref 1.4–6.5)
NEUTS SEG NFR BLD: 68.9 %
PLATELET # BLD AUTO: 278 K/UL (ref 130–400)
POTASSIUM SERPL-SCNC: 4 MEQ/L (ref 3.4–4.9)
RBC # BLD AUTO: 5.38 M/UL (ref 4.7–6.1)
SODIUM SERPL-SCNC: 136 MEQ/L (ref 135–144)
WBC # BLD AUTO: 9.7 K/UL (ref 4.8–10.8)

## 2025-04-03 PROCEDURE — 85025 COMPLETE CBC W/AUTO DIFF WBC: CPT

## 2025-04-03 PROCEDURE — 2709999900 HC NON-CHARGEABLE SUPPLY: Performed by: INTERNAL MEDICINE

## 2025-04-03 PROCEDURE — 93458 L HRT ARTERY/VENTRICLE ANGIO: CPT | Performed by: INTERNAL MEDICINE

## 2025-04-03 PROCEDURE — 7100000011 HC PHASE II RECOVERY - ADDTL 15 MIN: Performed by: INTERNAL MEDICINE

## 2025-04-03 PROCEDURE — C1894 INTRO/SHEATH, NON-LASER: HCPCS | Performed by: INTERNAL MEDICINE

## 2025-04-03 PROCEDURE — 36415 COLL VENOUS BLD VENIPUNCTURE: CPT

## 2025-04-03 PROCEDURE — B2111ZZ FLUOROSCOPY OF MULTIPLE CORONARY ARTERIES USING LOW OSMOLAR CONTRAST: ICD-10-PCS | Performed by: INTERNAL MEDICINE

## 2025-04-03 PROCEDURE — 7100000010 HC PHASE II RECOVERY - FIRST 15 MIN: Performed by: INTERNAL MEDICINE

## 2025-04-03 PROCEDURE — 6360000004 HC RX CONTRAST MEDICATION: Performed by: INTERNAL MEDICINE

## 2025-04-03 PROCEDURE — B2151ZZ FLUOROSCOPY OF LEFT HEART USING LOW OSMOLAR CONTRAST: ICD-10-PCS | Performed by: INTERNAL MEDICINE

## 2025-04-03 PROCEDURE — 4A023N7 MEASUREMENT OF CARDIAC SAMPLING AND PRESSURE, LEFT HEART, PERCUTANEOUS APPROACH: ICD-10-PCS | Performed by: INTERNAL MEDICINE

## 2025-04-03 PROCEDURE — 6360000002 HC RX W HCPCS: Performed by: INTERNAL MEDICINE

## 2025-04-03 PROCEDURE — 80048 BASIC METABOLIC PNL TOTAL CA: CPT

## 2025-04-03 PROCEDURE — 99152 MOD SED SAME PHYS/QHP 5/>YRS: CPT | Performed by: INTERNAL MEDICINE

## 2025-04-03 PROCEDURE — C1769 GUIDE WIRE: HCPCS | Performed by: INTERNAL MEDICINE

## 2025-04-03 RX ORDER — FUROSEMIDE 40 MG/1
40 TABLET ORAL 2 TIMES DAILY
Qty: 60 TABLET | Refills: 3 | Status: SHIPPED | OUTPATIENT
Start: 2025-04-03 | End: 2025-04-03

## 2025-04-03 RX ORDER — SPIRONOLACTONE 25 MG/1
25 TABLET ORAL DAILY
Qty: 30 TABLET | Refills: 3 | Status: SHIPPED | OUTPATIENT
Start: 2025-04-03

## 2025-04-03 RX ORDER — ASPIRIN 81 MG/1
81 TABLET ORAL DAILY
Qty: 30 TABLET | Refills: 3 | Status: SHIPPED | OUTPATIENT
Start: 2025-04-03 | End: 2025-04-03

## 2025-04-03 RX ORDER — LIDOCAINE HYDROCHLORIDE 10 MG/ML
INJECTION, SOLUTION INFILTRATION; PERINEURAL PRN
Status: DISCONTINUED | OUTPATIENT
Start: 2025-04-03 | End: 2025-04-03 | Stop reason: HOSPADM

## 2025-04-03 RX ORDER — IOPAMIDOL 612 MG/ML
INJECTION, SOLUTION INTRAVASCULAR PRN
Status: DISCONTINUED | OUTPATIENT
Start: 2025-04-03 | End: 2025-04-03 | Stop reason: HOSPADM

## 2025-04-03 RX ORDER — FUROSEMIDE 40 MG/1
40 TABLET ORAL DAILY
Status: DISCONTINUED | OUTPATIENT
Start: 2025-04-03 | End: 2025-04-03 | Stop reason: HOSPADM

## 2025-04-03 RX ORDER — METOPROLOL SUCCINATE 50 MG/1
25 TABLET, EXTENDED RELEASE ORAL DAILY
Qty: 45 TABLET | Refills: 1 | Status: SHIPPED | OUTPATIENT
Start: 2025-04-03 | End: 2025-04-03

## 2025-04-03 RX ORDER — SPIRONOLACTONE 25 MG/1
25 TABLET ORAL DAILY
Status: DISCONTINUED | OUTPATIENT
Start: 2025-04-03 | End: 2025-04-03 | Stop reason: HOSPADM

## 2025-04-03 RX ORDER — METOPROLOL SUCCINATE 50 MG/1
25 TABLET, EXTENDED RELEASE ORAL DAILY
Qty: 45 TABLET | Refills: 1 | Status: SHIPPED | OUTPATIENT
Start: 2025-04-03

## 2025-04-03 RX ORDER — SODIUM CHLORIDE 9 MG/ML
INJECTION, SOLUTION INTRAVENOUS PRN
Status: DISCONTINUED | OUTPATIENT
Start: 2025-04-03 | End: 2025-04-03 | Stop reason: HOSPADM

## 2025-04-03 RX ORDER — MIDAZOLAM HYDROCHLORIDE 1 MG/ML
INJECTION, SOLUTION INTRAMUSCULAR; INTRAVENOUS PRN
Status: DISCONTINUED | OUTPATIENT
Start: 2025-04-03 | End: 2025-04-03 | Stop reason: HOSPADM

## 2025-04-03 RX ORDER — SODIUM CHLORIDE 9 MG/ML
INJECTION, SOLUTION INTRAVENOUS CONTINUOUS
Status: DISCONTINUED | OUTPATIENT
Start: 2025-04-03 | End: 2025-04-03 | Stop reason: HOSPADM

## 2025-04-03 RX ORDER — ASPIRIN 81 MG/1
81 TABLET ORAL DAILY
Status: DISCONTINUED | OUTPATIENT
Start: 2025-04-03 | End: 2025-04-03 | Stop reason: HOSPADM

## 2025-04-03 RX ORDER — FENTANYL CITRATE 50 UG/ML
INJECTION, SOLUTION INTRAMUSCULAR; INTRAVENOUS PRN
Status: DISCONTINUED | OUTPATIENT
Start: 2025-04-03 | End: 2025-04-03 | Stop reason: HOSPADM

## 2025-04-03 RX ORDER — ASPIRIN 81 MG/1
81 TABLET ORAL DAILY
Qty: 30 TABLET | Refills: 3 | Status: SHIPPED | OUTPATIENT
Start: 2025-04-03

## 2025-04-03 RX ORDER — SODIUM CHLORIDE 0.9 % (FLUSH) 0.9 %
5-40 SYRINGE (ML) INJECTION EVERY 12 HOURS SCHEDULED
Status: DISCONTINUED | OUTPATIENT
Start: 2025-04-03 | End: 2025-04-03 | Stop reason: HOSPADM

## 2025-04-03 RX ORDER — ATORVASTATIN CALCIUM 40 MG/1
40 TABLET, FILM COATED ORAL DAILY
Qty: 30 TABLET | Refills: 0 | Status: SHIPPED | OUTPATIENT
Start: 2025-04-03 | End: 2025-04-03

## 2025-04-03 RX ORDER — ACETAMINOPHEN 325 MG/1
650 TABLET ORAL EVERY 4 HOURS PRN
Status: DISCONTINUED | OUTPATIENT
Start: 2025-04-03 | End: 2025-04-03 | Stop reason: HOSPADM

## 2025-04-03 RX ORDER — ATORVASTATIN CALCIUM 40 MG/1
40 TABLET, FILM COATED ORAL DAILY
Qty: 30 TABLET | Refills: 0 | Status: SHIPPED | OUTPATIENT
Start: 2025-04-03

## 2025-04-03 RX ORDER — FUROSEMIDE 40 MG/1
40 TABLET ORAL 2 TIMES DAILY
Qty: 60 TABLET | Refills: 3 | Status: SHIPPED | OUTPATIENT
Start: 2025-04-03

## 2025-04-03 RX ORDER — SODIUM CHLORIDE 0.9 % (FLUSH) 0.9 %
5-40 SYRINGE (ML) INJECTION PRN
Status: DISCONTINUED | OUTPATIENT
Start: 2025-04-03 | End: 2025-04-03 | Stop reason: HOSPADM

## 2025-04-03 RX ORDER — ATORVASTATIN CALCIUM 40 MG/1
40 TABLET, FILM COATED ORAL NIGHTLY
Status: DISCONTINUED | OUTPATIENT
Start: 2025-04-03 | End: 2025-04-03 | Stop reason: HOSPADM

## 2025-04-03 RX ORDER — LOSARTAN POTASSIUM 50 MG/1
50 TABLET ORAL DAILY
Qty: 30 TABLET | Refills: 3 | Status: SHIPPED | OUTPATIENT
Start: 2025-04-03 | End: 2025-04-03 | Stop reason: HOSPADM

## 2025-04-03 RX ORDER — HEPARIN SODIUM 1000 [USP'U]/ML
INJECTION, SOLUTION INTRAVENOUS; SUBCUTANEOUS PRN
Status: DISCONTINUED | OUTPATIENT
Start: 2025-04-03 | End: 2025-04-03 | Stop reason: HOSPADM

## 2025-04-03 RX ORDER — MIDAZOLAM HYDROCHLORIDE 2 MG/2ML
2 INJECTION, SOLUTION INTRAMUSCULAR; INTRAVENOUS
Status: DISCONTINUED | OUTPATIENT
Start: 2025-04-03 | End: 2025-04-03 | Stop reason: HOSPADM

## 2025-04-03 RX ORDER — SPIRONOLACTONE 25 MG/1
25 TABLET ORAL DAILY
Qty: 30 TABLET | Refills: 3 | Status: SHIPPED | OUTPATIENT
Start: 2025-04-03 | End: 2025-04-03

## 2025-04-03 ASSESSMENT — ENCOUNTER SYMPTOMS
DIARRHEA: 0
SHORTNESS OF BREATH: 0
VOMITING: 0
COUGH: 0
NAUSEA: 0

## 2025-04-03 NOTE — BRIEF OP NOTE
Section of Cardiology  Adult Brief Cardiac Cath Procedure Note        Procedure(s):  LHC, b/l coronary angio, LV gram    Pre-operative Diagnosis: Cardiomyopathy    H&P Status: Completed and reviewed.     Post-operative Diagnosis: Mild CAD    Findings:  See full report  Left dominant system  Left main: Big size vessel mild disease  LAD: Big size vessel mild disease  Circumflex: Big size vessel mild disease  RCA: Small nondominant mild disease  LVEDP 22 mmHg  No aortic valve gradient on catheter pullback  EF 15% by LV gram    Complications:  none    Recommendations:  Transfer patient back to holding area for post diagnostic cath management  Maximize medical therapy   Continue aspirin and high intensity statin indefinitely for secondary prevention of CAD  Continue beta-blocker and Entresto  Patient can go home after LifeVest  Bedrest for 2 hours  Follow-up with me in 1 week      Primary Proceduralist:   Francis Bella MD    Full procedure note to follow

## 2025-04-03 NOTE — PRE SEDATION
Sedation Plan  ASA: class 3 - patient with severe systemic disease     Mallampati class: III - soft palate, base of uvula visible.    Sedation plan: moderate (conscious sedation)    Risks, benefits, and alternatives discussed with patient.  Use of blood products discussed with patient who consented to blood products.

## 2025-04-03 NOTE — PRE SEDATION
Sedation Pre- Procedure Evaluation    Patient name: Siddhartha Snyder  YOB: 1970  MRN: [id number]   Allergies:   Patient has no known allergies.        Type Of Sedation  [x]local anesthesia  [x]moderate (conscious sedation)  []deep/analgesia      RN Focused History    Yes        No  N/A  []   [x]  Previous problem with airway anesthesia, sedation, or family history of the same    []   [x]  History Of tobacco, alcohol, or substance abuse     [x]   []  Problems associated with stridor, snoring, or sleep apnea    []   [] [x] Pediatric patient, history of premature birth or ventilator support (Moderate Sedation Only)     [x]   [] [] Moderate Sedation: Clear liquids within 6 hours of sedation and NPO within 2 hours    [x]   [] [] Deep Sedation:Clear liquids within 6 hours of sedation and NPO within 2 hours      NPO since: 1700       Vitals, Cardiac Rhythm, Pain:   Vitals  Temp: 98.6 °F (37 °C)  Temp Source: Oral  Pulse: 99  Heart Rate Source: Monitor  Respirations: 16  BP: 98/77  MAP (Calculated): 84  MAP (mmHg): 81  BP Location: Left upper arm  BP Upper/Lower: Upper  BP Method: Automatic  Patient Position: Sitting  Cardiac Rhythm: Sinus rhythm  Pain Assessment  Pain Assessment: None - Denies Pain  Pain Level: 0  Pain Location: Chest  Pain Orientation: Mid  Pain Type: Acute pain  Response to Pain Intervention: None (pain unchanged or no improvement)      EKG:  EKG Interpretation: normal EKG, normal sinus rhythm, unchanged from previous tracings.      Kwaku Scale: Activity: Able to move four extremities voluntarily or on command  Respiration: Able to breathe and cough freely  Circulation: Blood pressure within 20% of preanesthesia level  Consciousness: Fully awake  Oxygen: SAO2 > 92% on room air   Kwaku Score: 10     Activity:  2 - Able to move 4 extremities voluntarily on command  Respiration:  2 - Able to breathe deeply and cough freely  Circulation:  2 - BP+/- 20mmHg of normal  Consciousness:  2 - Fully

## 2025-04-03 NOTE — PLAN OF CARE
Problem: Chronic Conditions and Co-morbidities  Goal: Patient's chronic conditions and co-morbidity symptoms are monitored and maintained or improved  4/2/2025 2137 by Maria De Jesus Simth RN  Outcome: Progressing  4/2/2025 1116 by Mirlande Mccloud RN  Outcome: Progressing     Problem: Discharge Planning  Goal: Discharge to home or other facility with appropriate resources  4/2/2025 2137 by Maria De Jesus Smith, RN  Outcome: Progressing  4/2/2025 1116 by Mirlande Mccloud RN  Outcome: Progressing     Problem: Pain  Goal: Verbalizes/displays adequate comfort level or baseline comfort level  4/2/2025 2137 by Maria De Jesus Smith RN  Outcome: Progressing  4/2/2025 1116 by Mirlande Mccloud RN  Outcome: Progressing

## 2025-04-03 NOTE — PROGRESS NOTES
DATE OF CONSULTATION  4/2/2025    CONSULTANT  Dr. Bella     REQUESTING PHYSICIAN  Stephen Neff MD     PRIMARY CARDIOLOGIST    REASON FOR CONSULTATION  Chief Complaint   Patient presents with    Shortness of Breath       Hospital Day: 2       Patient is a 54 y.o. male Pmhx of hypertension not on medications who presents with a chief complaint of shortness of breath. Patient is followed on a regular basis by Dr. Morrison primary care provider on file.    Cardiology was consulted for CHF exacerbation.    Patient reporting shortness of breath x 1 day.  Patient also reporting some midsternal nonradiating chest pain mainly with coughing.  Has received a breathing treatment with improvement.  Does not wear oxygen at home.  States that he is pretty active at work without symptoms.  Patient reports that he only sleeps 1 to 2 hours a night.  Was told that he snores at night.   Has not been seen by a doctor for years.  Does not take any medications.  + dizziness/lightheadedness, swelling in lower extremities x2 weeks   States that he was feeling dizzy/lightheaded and laid down on the couch.   Denies any nausea, vomiting, syncope.     Family history of diabetes and heart disease.  Social alcohol use.  Denies any smoking or illicit drug use.  Denies any prior MI's, CVA's, CABGs, cardiac catheterizations.    Troponin: 47, 41  BNP 4040  Chest x-ray: Diffuse hazy density worse on the right.  Findings may be due to pulmonary edema or pneumonia.  ===============  Hospital course  4/2/2025  Patient sitting in a chair  Reports that shortness of breath is significantly improved  Lower extremities trace pitting edema  Patient diuresing well  Patient was found with new severe cardiomyopathy EF 10 to 15%  Plan for coronary angiogram tomorrow morning  Patient in agreement      History reviewed. No pertinent past medical history.   Patient Active Problem List   Diagnosis    Calculus of kidney    Pulmonary edema cardiac cause (HCC)    Heart 
0830: Report called to MOIZ Costello, on 1 west. IV saline locked. Patient remains on 5L O2 via nasal cannula, tolerating well. Tele monitor applied, verified placement with monitor room. Patient in no distress on departure from ED.   
Assumed care of patient, outer pressure band taken off at 1235- right wrist remains stable. No bleeding, hematoma, swelling. Telephone report given to Abiola ROCKWELL. Vitals stable.   
CLINICAL PHARMACY NOTE: MEDS TO BEDS    Total # of Prescriptions Filled: 7   The following medications were delivered to the patient:  Jardiance 10 mg Tab  Atorvastatin 40 mg Tab  Aspirin 81 mg Tab  Furosemide 40 mg Tab  Metoprolol Er 50 mg Tab  Entresto 24-26 mg Tab  Spironolactone 25 mg Tab    Additional Documentation:    
Iv and tele removed for discharge. Care plan completed for discharge. Appropriate education addressed in discharge instructions. Instructions completed and reviewed with patient. Verbalize understanding of instructions and follow up. Personal belongings gathered. No complaints. Transport wheelchair called. Family member present. Right radial site care given. Return to work slip given to pt  
Progress Note  Date:2025       Room:W163/W163-01  Patient Name:Siddhartha Snyder     YOB: 1970     Age:54 y.o.        Subjective    Subjective:  Symptoms:  He reports malaise and weakness.  No shortness of breath, cough, chest pain, headache, chest pressure, anorexia, diarrhea or anxiety.    Diet:  No nausea or vomiting.       Review of Systems   Respiratory:  Negative for cough and shortness of breath.    Cardiovascular:  Negative for chest pain.   Gastrointestinal:  Negative for anorexia, diarrhea, nausea and vomiting.   Neurological:  Positive for weakness.     Objective         Vitals Last 24 Hours:  TEMPERATURE:  Temp  Av.8 °F (36.6 °C)  Min: 97.2 °F (36.2 °C)  Max: 98.2 °F (36.8 °C)  RESPIRATIONS RANGE: Resp  Av.9  Min: 17  Max: 35  PULSE OXIMETRY RANGE: SpO2  Av.1 %  Min: 95 %  Max: 100 %  PULSE RANGE: Pulse  Av.2  Min: 90  Max: 126  BLOOD PRESSURE RANGE: Systolic (24hrs), Av , Min:104 , Max:220   ; Diastolic (24hrs), Av, Min:74, Max:160    I/O (24Hr):    Intake/Output Summary (Last 24 hours) at 2025 1127  Last data filed at 3/31/2025 1638  Gross per 24 hour   Intake 252.05 ml   Output --   Net 252.05 ml     Objective:  General Appearance:  Comfortable, well-appearing and in no acute distress.    Vital signs: (most recent): Blood pressure (!) 149/93, pulse (!) 107, temperature 98.2 °F (36.8 °C), temperature source Oral, resp. rate 18, height 1.727 m (5' 8\"), weight 117.9 kg (260 lb), SpO2 95%.    HEENT: Normal HEENT exam.    Lungs:  There are rales and decreased breath sounds.    Heart: S1 normal and S2 normal.    Abdomen: Abdomen is soft.  Bowel sounds are normal.   There is no epigastric area tenderness.     Pulses: Distal pulses are intact.    Neurological: Patient is alert and oriented to person, place and time.    Pupils:  Pupils are equal, round, and reactive to light.    Skin:  Warm.      Labs/Imaging/Diagnostics    Labs:  CBC:  Recent Labs     
Pt is back from procedure, report from Bel Powers. Pt is A&OX4, pt was a negative cath. Pt has a radial pressure band on, outer band will be removed in 1 hr. Pt denies any pain or sob at this time. Pt is on bedside monitor and VSS. Pt is on bedrest.  
03/31/25  1250 04/01/25  0612 04/02/25  0514   WBC 11.6*  --  14.2* 9.9   RBC 5.42  --  5.23 5.31   HGB 12.4* 14.9 11.9* 12.0*   HCT 42.4  --  39.3* 41.1*   MCV 78.2*  --  75.1* 77.4*   RDW 15.8*  --  15.8* 16.3*     --  277 290     CHEMISTRIES:  Recent Labs     03/31/25  1220 03/31/25  1250 04/01/25  0612 04/02/25  0514   *  --  133* 136   K 4.1  --  4.7 4.3   CL 91*  --  96 98   CO2 19*  --  22 28   BUN 19  --  20 29*   CREATININE 2.12* 1.8* 1.20 1.33*   GLUCOSE 273*  --  134* 94   MG 1.8  --   --   --      PT/INR:No results for input(s): \"PROTIME\", \"INR\" in the last 72 hours.  APTT:No results for input(s): \"APTT\" in the last 72 hours.  LIVER PROFILE:  Recent Labs     03/31/25  1220   AST 23   ALT 16   BILITOT 1.2*   ALKPHOS 106*       Imaging Last 24 Hours:  XR CHEST PORTABLE  Result Date: 3/31/2025  EXAMINATION: ONE XRAY VIEW OF THE CHEST 3/31/2025 12:21 pm COMPARISON: None. HISTORY: ORDERING SYSTEM PROVIDED HISTORY: Sepsis TECHNOLOGIST PROVIDED HISTORY: Reason for exam:->Sepsis What reading provider will be dictating this exam?->CRC FINDINGS: The heart is enlarged.  Pulmonary vessels appear congested.  Diffuse hazy density worse on the right.  No pneumothorax or large pleural effusion.     1. Cardiomegaly with pulmonary vascular congestion. 2. Diffuse hazy density worse on the right. Findings may be due to pulmonary edema or pneumonia.     Assessment//Plan           Hospital Problems           Last Modified POA    * (Principal) Pulmonary edema cardiac cause (HCC) 3/31/2025 Yes    Heart failure 4/1/2025 Yes   1) pulmonary edema  2) CHF exacerbation, new onset  3) HTN urgency  4) acute hypoxix respiratory failure due to   Assessment & Plan  4/1: Follow-up echo, patient downgraded to intermediate care, off BiPAP.  Continue Lasix , continue telemetry, follow cardiology spoke with nursing about the care, BP is better TCT 55 min  4/2: Low EF noted on echo patient is cardiac cath prior to discharge for 
patient is cardiac cath prior to discharge for new onset cardiomyopathy.  Patient has possible CKD needs to follow renal as outpatient.  No acute kidney injury at this time. Spoke with nursing, TCT 55 min  4/3: Patient would like to be discharged.  Spoke with renal about the care.  If renal function worsening will get renal evaluation as outpatient.  Continue current care cardiac cath today.  If okay with cardio patient can be discharged TCT 55 min  Electronically signed by Stephen Neff MD on 4/1/25 at 11:27 AM EDT

## 2025-04-03 NOTE — CARE COORDINATION
PT HAS AGREED TO FINANCIAL ASSISTANCE/RESPONSIBILITY WHILE HIS INSURANCE PROCESSES [employer] CLAIM.   FITTER HERE NOW.  PLAN FOR HOME AFTER.

## 2025-04-03 NOTE — CARE COORDINATION
PT FOR CATH TODAY.  SPOKE WITH MEL GARAY, STILL AWAITING INSURANCE APPROVAL FOR [employer].  ENTRESTO AND JARDIANCE COUPONS PLACED ON BLUE CHART.

## 2025-04-03 NOTE — INTERVAL H&P NOTE
Update History & Physical    The patient's History and Physical of April 3, chart was reviewed with the patient and I examined the patient. There was no change. The surgical site was confirmed by the patient and me.     Plan: The risks, benefits, expected outcome, and alternative to the recommended procedure have been discussed with the patient. Patient understands and wants to proceed with the procedure.     Electronically signed by Francis Bella MD on 4/3/2025 at 9:06 AM

## 2025-04-04 LAB — ECHO BSA: 2.38 M2

## 2025-04-05 LAB
BACTERIA BLD CULT ORG #2: NORMAL
BACTERIA BLD CULT: NORMAL

## 2025-04-25 ENCOUNTER — OFFICE VISIT (OUTPATIENT)
Age: 55
End: 2025-04-25
Payer: COMMERCIAL

## 2025-04-25 VITALS
RESPIRATION RATE: 16 BRPM | HEART RATE: 103 BPM | SYSTOLIC BLOOD PRESSURE: 118 MMHG | BODY MASS INDEX: 36.5 KG/M2 | DIASTOLIC BLOOD PRESSURE: 78 MMHG | WEIGHT: 240 LBS | OXYGEN SATURATION: 98 %

## 2025-04-25 DIAGNOSIS — I42.9 CARDIOMYOPATHY, UNSPECIFIED TYPE (HCC): Primary | ICD-10-CM

## 2025-04-25 DIAGNOSIS — I50.9 CONGESTIVE HEART FAILURE, UNSPECIFIED HF CHRONICITY, UNSPECIFIED HEART FAILURE TYPE (HCC): ICD-10-CM

## 2025-04-25 PROCEDURE — 99214 OFFICE O/P EST MOD 30 MIN: CPT | Performed by: INTERNAL MEDICINE

## 2025-04-25 ASSESSMENT — ENCOUNTER SYMPTOMS
CONSTIPATION: 0
WHEEZING: 0
APNEA: 0
NAUSEA: 0
ABDOMINAL PAIN: 0
RHINORRHEA: 0
COUGH: 0
EYE REDNESS: 0
VOMITING: 0
DIARRHEA: 0
SHORTNESS OF BREATH: 0
CHEST TIGHTNESS: 0
COLOR CHANGE: 0

## 2025-04-25 NOTE — PROGRESS NOTES
Chief Complaint   Patient presents with    Follow-Up from Hospital       Patient presents for initial medical evaluation. Patient is followed on a regular basis by Dr. Morrison primary care provider on file..     Mild CAD by cath 4/3/2025  Nonischemic cardiomyopathy EF 10 to 15% by TTE 4/1/2025 currently on a LifeVest  Hypertension  Hyperlipidemia  Obesity    Cardiac studies:  Coronary angiogram 4/3/2025 left dominant system, mild CAD, RCA small nondominant  TTE 4/1/2025 EF 10 to 15%  =================  4/25/2025  First clinic visit follow-up on recent hospitalization  At the end of last month patient was admitted to the hospital for shortness of breath  Patient was found with new severe cardiomyopathy and in heart failure exacerbation  Patient responded to diuretics well  Patient underwent coronary angiogram which showed mild CAD  Patient was sent home with a LifeVest  Today patient reports feeling a lot better  Denies chest pain or shortness of breath  Blood pressure today 118/78  Patient also following a diet for his cardiomyopathy    Patient Active Problem List   Diagnosis    Calculus of kidney    Pulmonary edema cardiac cause (HCC)    Heart failure (HCC)    Cardiomyopathy (HCC)       Past Surgical History:   Procedure Laterality Date    APPENDECTOMY  1976    CARDIAC PROCEDURE N/A 4/3/2025    Left heart cath / coronary angiography performed by Francis Bella MD at [facility] CARDIAC CATH LAB    LITHOTRIPSY Left 4/14/2021    LEFT ESWL EXTRACORPOREAL SHOCK WAVE LITHOTRIPSY performed by Casper Santa MD at [facility] OR       Social History     Socioeconomic History    Marital status: Single   Tobacco Use    Smoking status: Never    Smokeless tobacco: Never   Substance and Sexual Activity    Alcohol use: Yes     Comment: occ     Drug use: No    Sexual activity: Yes     Social Drivers of Health     Food Insecurity: No Food Insecurity (4/1/2025)    Hunger Vital Sign     Worried About Running Out of Food in the Last Year: Never

## 2025-04-28 ENCOUNTER — TELEPHONE (OUTPATIENT)
Age: 55
End: 2025-04-28

## 2025-04-28 NOTE — TELEPHONE ENCOUNTER
Mar approved Jardiance 10 MG tablet.    Date created: 4/25/2025   Reference # [id number]      Approval scanned into chart.

## 2025-07-15 ENCOUNTER — TELEPHONE (OUTPATIENT)
Age: 55
End: 2025-07-15

## 2025-07-15 NOTE — TELEPHONE ENCOUNTER
Notes: 3 month follow up + echo results   Disposition Notes:     Made On:  Canceled: 4/25/2025 2:04 PM  7/15/2025 9:31 AM By:  By: BRANDIN REYES NICOLE Cancel Rsn: Patient (has testing to do, just wants to cancel at this time)

## 2025-07-15 NOTE — TELEPHONE ENCOUNTER
Appointment could not be canceled for Siddhartha Snyder ([id number])   Visit type: CARDIO FOLLOW UP   7/18/2025 2:15 PM (15 minutes) with Dr. Francis Bella MD in [facility] CARDIOLOGY [facility]      Reason for cancellation: Other      The above appointment could not be canceled due to technical reasons.Please cancel the appointment and inform the patient.     Appointment not canceled  (Newest Message First)  View All Conversations on this Encounter  Siddhartha Snyder  P [facility] Cardiology Front Desk3 minutes ago (9:19 AM)     DO  Appointment could not be canceled for Siddhartha Snyder ([id number])  Visit type: CARDIO FOLLOW UP  7/18/2025 2:15 PM (15 minutes) with Dr. Francis Bella MD in [facility] CARDIOLOG[facility]     Reason for cancellation: Other     The above appointment could not be canceled due to technical reasons.Please cancel the appointment and inform the patient.

## (undated) DEVICE — GUIDEWIRE VASC L260CM DIA0.035IN TIP L3MM STD EXCHG PTFE J

## (undated) DEVICE — RADIFOCUS OPTITORQUE ANGIOGRAPHIC CATHETER: Brand: OPTITORQUE

## (undated) DEVICE — KIT ANGIO W/ AT P65 PREM HND CTRL FOR CNTRST DEL ANGIOTOUCH

## (undated) DEVICE — Device

## (undated) DEVICE — CATHETER COR DIAG PIGTAILS PIG 145 CRV 5FR 110CM 6 SIDE H

## (undated) DEVICE — KIT MFLD ISOLATN NACL CNTRST PRT TBNG SPIK W/ PRSS TRNSDUC

## (undated) DEVICE — SYRINGE MED 10ML LUERLOCK TIP W/O SFTY DISP

## (undated) DEVICE — 3M™ TEGADERM™ TRANSPARENT FILM DRESSING FRAME STYLE, 1626W, 4 IN X 4-3/4 IN (10 CM X 12 CM), 50/CT 4CT/CASE: Brand: 3M™ TEGADERM™

## (undated) DEVICE — GLOVE SURG SZ 65 THK91MIL LTX FREE SYN POLYISOPRENE

## (undated) DEVICE — GLIDESHEATH SLENDER STAINLESS STEEL KIT: Brand: GLIDESHEATH SLENDER

## (undated) DEVICE — RENTAL ESWL SERVICES UNILATERAL

## (undated) DEVICE — SYRINGE MEDICAL 3ML CLEAR PLASTIC STANDARD NON CONTROL LUERLOCK TIP DISPOSABLE

## (undated) DEVICE — DRAPE SURG BRACH STRL

## (undated) DEVICE — DRESSING QUIKCLOT RADIAL 0.8X1.5 IN W/ADHESIVE

## (undated) DEVICE — GLOVE ORANGE PI 7   MSG9070

## (undated) DEVICE — CATHETER DIAG 5FR L100CM LUMN ID0.047IN JR4 CRV 0 SIDE H

## (undated) DEVICE — CONTAINER,SPECIMEN,OR STERILE,4OZ: Brand: MEDLINE

## (undated) DEVICE — SYRINGE MED 30ML STD CLR PLAS LUERLOCK TIP N CTRL DISP

## (undated) DEVICE — ELECTRODE TRAIN EDGE SYS W/ QUIK-COMBO CONN